# Patient Record
Sex: FEMALE | Race: WHITE | Employment: FULL TIME | URBAN - METROPOLITAN AREA
[De-identification: names, ages, dates, MRNs, and addresses within clinical notes are randomized per-mention and may not be internally consistent; named-entity substitution may affect disease eponyms.]

---

## 2017-06-20 ENCOUNTER — HOSPITAL ENCOUNTER (EMERGENCY)
Age: 52
Discharge: HOME | End: 2017-06-20
Attending: EMERGENCY MEDICINE
Payer: COMMERCIAL

## 2017-06-20 VITALS
OXYGEN SATURATION: 98 % | DIASTOLIC BLOOD PRESSURE: 84 MMHG | WEIGHT: 140 LBS | HEART RATE: 57 BPM | TEMPERATURE: 97.7 F | RESPIRATION RATE: 10 BRPM | HEIGHT: 67 IN | SYSTOLIC BLOOD PRESSURE: 127 MMHG | BODY MASS INDEX: 21.97 KG/M2

## 2017-06-20 DIAGNOSIS — R03.0 ELEVATED BLOOD PRESSURE READING WITHOUT DIAGNOSIS OF HYPERTENSION: ICD-10-CM

## 2017-06-20 DIAGNOSIS — R07.9 ACUTE CHEST PAIN: Primary | ICD-10-CM

## 2017-06-20 LAB
ANION GAP BLD CALC-SCNC: 8 MMOL/L (ref 5–15)
BASOPHILS # BLD AUTO: 0 K/UL (ref 0–0.1)
BASOPHILS # BLD: 0 % (ref 0–1)
BUN SERPL-MCNC: 11 MG/DL (ref 6–20)
BUN/CREAT SERPL: 15 (ref 12–20)
CALCIUM SERPL-MCNC: 8.4 MG/DL (ref 8.5–10.1)
CHLORIDE SERPL-SCNC: 99 MMOL/L (ref 97–108)
CO2 SERPL-SCNC: 29 MMOL/L (ref 21–32)
CREAT SERPL-MCNC: 0.73 MG/DL (ref 0.55–1.02)
EOSINOPHIL # BLD: 0.1 K/UL (ref 0–0.4)
EOSINOPHIL NFR BLD: 2 % (ref 0–7)
ERYTHROCYTE [DISTWIDTH] IN BLOOD BY AUTOMATED COUNT: 12.2 % (ref 11.5–14.5)
GLUCOSE SERPL-MCNC: 87 MG/DL (ref 65–100)
HCT VFR BLD AUTO: 36.5 % (ref 35–47)
HGB BLD-MCNC: 12.4 G/DL (ref 11.5–16)
LYMPHOCYTES # BLD AUTO: 45 % (ref 12–49)
LYMPHOCYTES # BLD: 3.1 K/UL (ref 0.8–3.5)
MAGNESIUM SERPL-MCNC: 2 MG/DL (ref 1.6–2.4)
MCH RBC QN AUTO: 31.3 PG (ref 26–34)
MCHC RBC AUTO-ENTMCNC: 34 G/DL (ref 30–36.5)
MCV RBC AUTO: 92.2 FL (ref 80–99)
MONOCYTES # BLD: 0.6 K/UL (ref 0–1)
MONOCYTES NFR BLD AUTO: 9 % (ref 5–13)
NEUTS SEG # BLD: 3.1 K/UL (ref 1.8–8)
NEUTS SEG NFR BLD AUTO: 44 % (ref 32–75)
PLATELET # BLD AUTO: 259 K/UL (ref 150–400)
POTASSIUM SERPL-SCNC: 3.7 MMOL/L (ref 3.5–5.1)
RBC # BLD AUTO: 3.96 M/UL (ref 3.8–5.2)
SODIUM SERPL-SCNC: 136 MMOL/L (ref 136–145)
TROPONIN I BLD-MCNC: <0.04 NG/ML (ref 0–0.08)
TROPONIN I BLD-MCNC: <0.04 NG/ML (ref 0–0.08)
WBC # BLD AUTO: 6.9 K/UL (ref 3.6–11)

## 2017-06-20 PROCEDURE — 93005 ELECTROCARDIOGRAM TRACING: CPT

## 2017-06-20 PROCEDURE — 85025 COMPLETE CBC W/AUTO DIFF WBC: CPT | Performed by: EMERGENCY MEDICINE

## 2017-06-20 PROCEDURE — 83735 ASSAY OF MAGNESIUM: CPT | Performed by: EMERGENCY MEDICINE

## 2017-06-20 PROCEDURE — 36415 COLL VENOUS BLD VENIPUNCTURE: CPT | Performed by: EMERGENCY MEDICINE

## 2017-06-20 PROCEDURE — 84484 ASSAY OF TROPONIN QUANT: CPT

## 2017-06-20 PROCEDURE — 80048 BASIC METABOLIC PNL TOTAL CA: CPT | Performed by: EMERGENCY MEDICINE

## 2017-06-20 PROCEDURE — 99285 EMERGENCY DEPT VISIT HI MDM: CPT

## 2017-06-20 PROCEDURE — 74011250637 HC RX REV CODE- 250/637: Performed by: EMERGENCY MEDICINE

## 2017-06-20 RX ORDER — SODIUM CHLORIDE 0.9 % (FLUSH) 0.9 %
5-10 SYRINGE (ML) INJECTION EVERY 8 HOURS
Status: DISCONTINUED | OUTPATIENT
Start: 2017-06-20 | End: 2017-06-21 | Stop reason: HOSPADM

## 2017-06-20 RX ORDER — SODIUM CHLORIDE 0.9 % (FLUSH) 0.9 %
5-10 SYRINGE (ML) INJECTION AS NEEDED
Status: DISCONTINUED | OUTPATIENT
Start: 2017-06-20 | End: 2017-06-21 | Stop reason: HOSPADM

## 2017-06-20 RX ORDER — GUAIFENESIN 100 MG/5ML
81 LIQUID (ML) ORAL DAILY
Qty: 30 TAB | Refills: 0 | Status: SHIPPED | OUTPATIENT
Start: 2017-06-20 | End: 2019-07-30

## 2017-06-20 RX ORDER — ASPIRIN 325 MG
325 TABLET ORAL
Status: COMPLETED | OUTPATIENT
Start: 2017-06-20 | End: 2017-06-20

## 2017-06-20 RX ADMIN — ASPIRIN 325 MG: 325 TABLET, COATED ORAL at 19:38

## 2017-06-20 NOTE — ED TRIAGE NOTES
Sudden onset of chest pain with dizziness and pressure behind eyes. Pain in chest radiated into back.

## 2017-06-20 NOTE — ED NOTES
Assumed care of pt. Bed locked and in low position with call bell within reach. Using AIDET-Introduced self as Primary RN and plan discussed with pt with understanding was verbalized. Pt denies additional complaints at this time. White board updated with this nurse's name. Patient advised that medical information will be discussed and it is their own responsibility to tell nurse if such conversation should not take place in the presence of visitors. Pt verbalizes understanding.

## 2017-06-20 NOTE — ED PROVIDER NOTES
HPI Comments: 47 y.o. female with past medical history significant for hypercholesterolemia and atypical lobular hyperplasia of breast who presents to the ED with chief complaint of chest pain. Pt reports chest discomfort she describes as \"tightness\" onset this afternoon that radiates through to her back and is accompanied by lightheadedness and visual disturbance. Pt states her back pain feels like a \"dull ache between her shoulder blades. \" Pt states she recently completed tx for bronchitis. Pt states she was recently seen by her cardiologist and her calcium score showed plaque buildup in 3 areas, says the plan is to f/u for a stress test. Pt states she has hx of high cholesterol. Pt denies hx of HTN or DM. There are no other acute medical complaints voiced at this time. PCP: Anthony Campos MD    Note written by Lisa Young, as dictated by Tania Gomes MD 7:16 PM     The history is provided by the patient.         Past Medical History:   Diagnosis Date    Atypical lobular hyperplasia of breast 2011    LEFT breast     Chest pain, unspecified 3/18/2011    Nausea & vomiting     post anesthesia    Other chest pain 3/18/2011    Other malaise and fatigue 3/18/2011    Other premature beats 3/18/2011    Palpitations 3/18/2011    Pure hypercholesterolemia 3/18/2011    Shortness of breath 3/18/2011       Past Surgical History:   Procedure Laterality Date    BIOPSY OF BREAST, NEEDLE CORE  2011    LEFT    HX BREAST BIOPSY  1996    RIGHT    HX BREAST BIOPSY  7/2011    LEFT     HX GYN      reanastomosis of tubes    HX GYN  7/2012    Uterine ablation    HX MOHS PROCEDURES  2010         Family History:   Problem Relation Age of Onset    Heart Disease Father     Cancer Maternal Grandmother      breast    Breast Cancer Maternal Grandmother 36     age 42's       Social History     Social History    Marital status:      Spouse name: N/A    Number of children: N/A    Years of education: N/A     Occupational History    Not on file. Social History Main Topics    Smoking status: Never Smoker    Smokeless tobacco: Never Used    Alcohol use 1.5 oz/week     3 Glasses of wine per week    Drug use: No    Sexual activity: Not on file     Other Topics Concern    Not on file     Social History Narrative         ALLERGIES: Pcn [penicillins]; Toprol xl [metoprolol succinate]; and Zithromax [azithromycin]    Review of Systems   Constitutional: Negative for chills and fever. HENT: Negative for ear pain and sore throat. Eyes: Positive for visual disturbance. Negative for photophobia and pain. Respiratory: Negative for chest tightness and shortness of breath. Cardiovascular: Positive for chest pain (radiating to back). Negative for leg swelling. Gastrointestinal: Negative for abdominal pain, nausea and vomiting. Genitourinary: Negative for dysuria and flank pain. Musculoskeletal: Positive for back pain. Negative for neck pain. Skin: Negative for rash and wound. Neurological: Positive for light-headedness. Negative for dizziness and headaches. All other systems reviewed and are negative. Vitals:    06/20/17 1906   BP: (!) 170/93   Pulse: 64   Resp: 18   Temp: 97.7 °F (36.5 °C)   SpO2: 95%   Weight: 63.5 kg (140 lb)   Height: 5' 7\" (1.702 m)            Physical Exam   Constitutional: She is oriented to person, place, and time. She appears well-developed and well-nourished. No distress. HENT:   Head: Normocephalic and atraumatic. Mouth/Throat: Oropharynx is clear and moist.   Eyes: Conjunctivae and EOM are normal. Pupils are equal, round, and reactive to light. Neck: Normal range of motion. Cardiovascular: Normal rate, regular rhythm, normal heart sounds and intact distal pulses. No murmur heard. Pulmonary/Chest: Effort normal and breath sounds normal. No stridor. No respiratory distress. Abdominal: Soft. Bowel sounds are normal. There is no tenderness. Musculoskeletal: Normal range of motion. She exhibits no edema or tenderness. Neurological: She is alert and oriented to person, place, and time. No cranial nerve deficit. Skin: Skin is warm and dry. She is not diaphoretic. Psychiatric: She has a normal mood and affect. Nursing note and vitals reviewed. MDM  Number of Diagnoses or Management Options  Acute chest pain:   Elevated blood pressure reading without diagnosis of hypertension:   Diagnosis management comments: Chest pain with dizziness and pressure behind eyes - concerned for heart trouble. Patient EKG and labs reviewed, trop x 2 negative. D/c home to start aspirin and refer to cardiology for further evaluations. Patient also advised to have her blood pressure evaluated       Amount and/or Complexity of Data Reviewed  Clinical lab tests: ordered and reviewed  Independent visualization of images, tracings, or specimens: yes (ekg)    Patient Progress  Patient progress: improved    ED Course       Procedures    ED EKG interpretation:  Rhythm: normal sinus rhythm; and regular . Rate (approx.): 62; Axis: normal; ST/T wave: No STEMI.      Note written by Lisa Adhikari, as dictated by Gricel Shaver MD 7:03 PM

## 2017-06-21 LAB
ATRIAL RATE: 62 BPM
CALCULATED P AXIS, ECG09: 70 DEGREES
CALCULATED R AXIS, ECG10: 64 DEGREES
CALCULATED T AXIS, ECG11: 53 DEGREES
DIAGNOSIS, 93000: NORMAL
P-R INTERVAL, ECG05: 166 MS
Q-T INTERVAL, ECG07: 416 MS
QRS DURATION, ECG06: 92 MS
QTC CALCULATION (BEZET), ECG08: 422 MS
VENTRICULAR RATE, ECG03: 62 BPM

## 2017-06-21 NOTE — DISCHARGE INSTRUCTIONS
Elevated Blood Pressure: Care Instructions  Your Care Instructions    Blood pressure is a measure of how hard the blood pushes against the walls of your arteries. It's normal for blood pressure to go up and down throughout the day. But if it stays up over time, you have high blood pressure. Two numbers tell you your blood pressure. The first number is the systolic pressure. It shows how hard the blood pushes when your heart is pumping. The second number is the diastolic pressure. It shows how hard the blood pushes between heartbeats, when your heart is relaxed and filling with blood. An ideal blood pressure in adults is less than 120/80 (say \"120 over 80\"). High blood pressure is 140/90 or higher. You have high blood pressure if your top number is 140 or higher or your bottom number is 90 or higher, or both. The main test for high blood pressure is simple, fast, and painless. To diagnose high blood pressure, your doctor will test your blood pressure at different times. After testing your blood pressure, your doctor may ask you to test it again when you are home. If you are diagnosed with high blood pressure, you can work with your doctor to make a long-term plan to manage it. Follow-up care is a key part of your treatment and safety. Be sure to make and go to all appointments, and call your doctor if you are having problems. It's also a good idea to know your test results and keep a list of the medicines you take. How can you care for yourself at home? · Do not smoke. Smoking increases your risk for heart attack and stroke. If you need help quitting, talk to your doctor about stop-smoking programs and medicines. These can increase your chances of quitting for good. · Stay at a healthy weight. · Try to limit how much sodium you eat to less than 2,300 milligrams (mg) a day. Your doctor may ask you to try to eat less than 1,500 mg a day. · Be physically active.  Get at least 30 minutes of exercise on most days of the week. Walking is a good choice. You also may want to do other activities, such as running, swimming, cycling, or playing tennis or team sports. · Avoid or limit alcohol. Talk to your doctor about whether you can drink any alcohol. · Eat plenty of fruits, vegetables, and low-fat dairy products. Eat less saturated and total fats. · Learn how to check your blood pressure at home. When should you call for help? Call your doctor now or seek immediate medical care if:  · Your blood pressure is much higher than normal (such as 180/110 or higher). · You think high blood pressure is causing symptoms such as:  ¨ Severe headache. ¨ Blurry vision. Watch closely for changes in your health, and be sure to contact your doctor if:  · You do not get better as expected. Where can you learn more? Go to http://asiya-xochitl.info/. Enter J276 in the search box to learn more about \"Elevated Blood Pressure: Care Instructions. \"  Current as of: April 3, 2017  Content Version: 11.3  © 4500-7610 [employer]. Care instructions adapted under license by [employer] (which disclaims liability or warranty for this information). If you have questions about a medical condition or this instruction, always ask your healthcare professional. [address] any warranty or liability for your use of this information. Chest Pain: Care Instructions  Your Care Instructions  There are many things that can cause chest pain. Some are not serious and will get better on their own in a few days. But some kinds of chest pain need more testing and treatment. Your doctor may have recommended a follow-up visit in the next 8 to 12 hours. If you are not getting better, you may need more tests or treatment. Even though your doctor has released you, you still need to watch for any problems. The doctor carefully checked you, but sometimes problems can develop later.  If you have new symptoms or if your symptoms do not get better, get medical care right away. If you have worse or different chest pain or pressure that lasts more than 5 minutes or you passed out (lost consciousness), call 911 or seek other emergency help right away. A medical visit is only one step in your treatment. Even if you feel better, you still need to do what your doctor recommends, such as going to all suggested follow-up appointments and taking medicines exactly as directed. This will help you recover and help prevent future problems. How can you care for yourself at home? · Rest until you feel better. · Take your medicine exactly as prescribed. Call your doctor if you think you are having a problem with your medicine. · Do not drive after taking a prescription pain medicine. When should you call for help? Call 911 if:  · You passed out (lost consciousness). · You have severe difficulty breathing. · You have symptoms of a heart attack. These may include:  ¨ Chest pain or pressure, or a strange feeling in your chest.  ¨ Sweating. ¨ Shortness of breath. ¨ Nausea or vomiting. ¨ Pain, pressure, or a strange feeling in your back, neck, jaw, or upper belly or in one or both shoulders or arms. ¨ Lightheadedness or sudden weakness. ¨ A fast or irregular heartbeat. After you call 911, the  may tell you to chew 1 adult-strength or 2 to 4 low-dose aspirin. Wait for an ambulance. Do not try to drive yourself. Call your doctor today if:  · You have any trouble breathing. · Your chest pain gets worse. · You are dizzy or lightheaded, or you feel like you may faint. · You are not getting better as expected. · You are having new or different chest pain. Where can you learn more? Go to http://asiya-xochitl.info/. Enter A120 in the search box to learn more about \"Chest Pain: Care Instructions. \"  Current as of: March 20, 2017  Content Version: 11.3  © 4450-7894 [employer]. Care instructions adapted under license by [employer] (which disclaims liability or warranty for this information). If you have questions about a medical condition or this instruction, always ask your healthcare professional. [address] any warranty or liability for your use of this information.

## 2017-06-21 NOTE — ED NOTES
Dr. Cindy Chan has reviewed discharge instructions with patient and spouse including prescription(s) if applicable. Patient has received and verbalizes understanding of all instructions and has no further questions at this time. Patient exits ED via ambulatory accompanied by spouse. Patient in no acute distress.

## 2017-12-29 ENCOUNTER — HOSPITAL ENCOUNTER (OUTPATIENT)
Dept: MAMMOGRAPHY | Age: 52
Discharge: HOME | End: 2017-12-29
Attending: SURGERY
Payer: COMMERCIAL

## 2017-12-29 DIAGNOSIS — Z12.31 VISIT FOR SCREENING MAMMOGRAM: ICD-10-CM

## 2017-12-29 PROCEDURE — 77067 SCR MAMMO BI INCL CAD: CPT

## 2018-01-05 ENCOUNTER — HOSPITAL ENCOUNTER (OUTPATIENT)
Dept: MAMMOGRAPHY | Age: 53
Discharge: HOME | End: 2018-01-05
Payer: COMMERCIAL

## 2018-01-05 ENCOUNTER — HOSPITAL ENCOUNTER (OUTPATIENT)
Dept: ULTRASOUND IMAGING | Age: 53
Discharge: HOME | End: 2018-01-05
Payer: COMMERCIAL

## 2018-01-05 DIAGNOSIS — R92.8 ABNORMAL MAMMOGRAM: ICD-10-CM

## 2018-01-05 PROCEDURE — 77065 DX MAMMO INCL CAD UNI: CPT

## 2018-01-05 PROCEDURE — 76642 ULTRASOUND BREAST LIMITED: CPT

## 2018-12-28 DIAGNOSIS — Z12.31 VISIT FOR SCREENING MAMMOGRAM: Primary | ICD-10-CM

## 2018-12-31 ENCOUNTER — HOSPITAL ENCOUNTER (OUTPATIENT)
Dept: MAMMOGRAPHY | Age: 53
Discharge: HOME | End: 2018-12-31
Attending: SURGERY
Payer: COMMERCIAL

## 2018-12-31 DIAGNOSIS — Z12.31 VISIT FOR SCREENING MAMMOGRAM: ICD-10-CM

## 2018-12-31 PROCEDURE — 77063 BREAST TOMOSYNTHESIS BI: CPT

## 2019-07-30 ENCOUNTER — HOSPITAL ENCOUNTER (OUTPATIENT)
Dept: PREADMISSION TESTING | Age: 54
Discharge: HOME | End: 2019-07-30
Payer: COMMERCIAL

## 2019-07-30 ENCOUNTER — HOSPITAL ENCOUNTER (OUTPATIENT)
Dept: NON INVASIVE DIAGNOSTICS | Age: 54
Discharge: HOME | End: 2019-07-30
Attending: ORTHOPAEDIC SURGERY
Payer: COMMERCIAL

## 2019-07-30 VITALS
WEIGHT: 140.7 LBS | SYSTOLIC BLOOD PRESSURE: 128 MMHG | RESPIRATION RATE: 18 BRPM | TEMPERATURE: 98.2 F | HEIGHT: 66 IN | BODY MASS INDEX: 22.61 KG/M2 | DIASTOLIC BLOOD PRESSURE: 67 MMHG | OXYGEN SATURATION: 99 % | HEART RATE: 59 BPM

## 2019-07-30 LAB
25(OH)D3 SERPL-MCNC: 35.1 NG/ML (ref 30–100)
ABO + RH BLD: NORMAL
ALBUMIN SERPL-MCNC: 4.2 G/DL (ref 3.5–5)
ALBUMIN/GLOB SERPL: 1.3 {RATIO} (ref 1.1–2.2)
ALP SERPL-CCNC: 64 U/L (ref 45–117)
ALT SERPL-CCNC: 55 U/L (ref 12–78)
ANION GAP SERPL CALC-SCNC: 2 MMOL/L (ref 5–15)
APPEARANCE UR: CLEAR
APTT PPP: 25.9 SEC (ref 22.1–32)
AST SERPL-CCNC: 36 U/L (ref 15–37)
ATRIAL RATE: 53 BPM
BACTERIA URNS QL MICRO: NEGATIVE /HPF
BASOPHILS # BLD: 0.1 K/UL (ref 0–0.1)
BASOPHILS NFR BLD: 1 % (ref 0–1)
BILIRUB SERPL-MCNC: 0.4 MG/DL (ref 0.2–1)
BILIRUB UR QL: NEGATIVE
BLOOD GROUP ANTIBODIES SERPL: NORMAL
BUN SERPL-MCNC: 10 MG/DL (ref 6–20)
BUN/CREAT SERPL: 15 (ref 12–20)
CALCIUM SERPL-MCNC: 9.3 MG/DL (ref 8.5–10.1)
CALCULATED P AXIS, ECG09: 75 DEGREES
CALCULATED R AXIS, ECG10: 68 DEGREES
CALCULATED T AXIS, ECG11: 55 DEGREES
CHLORIDE SERPL-SCNC: 99 MMOL/L (ref 97–108)
CO2 SERPL-SCNC: 34 MMOL/L (ref 21–32)
COLOR UR: NORMAL
CREAT SERPL-MCNC: 0.68 MG/DL (ref 0.55–1.02)
DIAGNOSIS, 93000: NORMAL
DIFFERENTIAL METHOD BLD: NORMAL
EOSINOPHIL # BLD: 0.1 K/UL (ref 0–0.4)
EOSINOPHIL NFR BLD: 2 % (ref 0–7)
EPITH CASTS URNS QL MICRO: NORMAL /LPF
ERYTHROCYTE [DISTWIDTH] IN BLOOD BY AUTOMATED COUNT: 12.5 % (ref 11.5–14.5)
EST. AVERAGE GLUCOSE BLD GHB EST-MCNC: 114 MG/DL
GLOBULIN SER CALC-MCNC: 3.3 G/DL (ref 2–4)
GLUCOSE SERPL-MCNC: 74 MG/DL (ref 65–100)
GLUCOSE UR STRIP.AUTO-MCNC: NEGATIVE MG/DL
HBA1C MFR BLD: 5.6 % (ref 4.2–6.3)
HCT VFR BLD AUTO: 40.4 % (ref 35–47)
HGB BLD-MCNC: 13.3 G/DL (ref 11.5–16)
HGB UR QL STRIP: NEGATIVE
HYALINE CASTS URNS QL MICRO: NORMAL /LPF (ref 0–5)
IMM GRANULOCYTES # BLD AUTO: 0 K/UL (ref 0–0.04)
IMM GRANULOCYTES NFR BLD AUTO: 0 % (ref 0–0.5)
INR PPP: 1 (ref 0.9–1.1)
KETONES UR QL STRIP.AUTO: NEGATIVE MG/DL
LEUKOCYTE ESTERASE UR QL STRIP.AUTO: NEGATIVE
LYMPHOCYTES # BLD: 1.9 K/UL (ref 0.8–3.5)
LYMPHOCYTES NFR BLD: 36 % (ref 12–49)
MCH RBC QN AUTO: 31.4 PG (ref 26–34)
MCHC RBC AUTO-ENTMCNC: 32.9 G/DL (ref 30–36.5)
MCV RBC AUTO: 95.5 FL (ref 80–99)
MONOCYTES # BLD: 0.5 K/UL (ref 0–1)
MONOCYTES NFR BLD: 9 % (ref 5–13)
NEUTS SEG # BLD: 2.8 K/UL (ref 1.8–8)
NEUTS SEG NFR BLD: 52 % (ref 32–75)
NITRITE UR QL STRIP.AUTO: NEGATIVE
NRBC # BLD: 0 K/UL (ref 0–0.01)
NRBC BLD-RTO: 0 PER 100 WBC
P-R INTERVAL, ECG05: 172 MS
PH UR STRIP: 7 [PH] (ref 5–8)
PLATELET # BLD AUTO: 279 K/UL (ref 150–400)
PMV BLD AUTO: 9.8 FL (ref 8.9–12.9)
POTASSIUM SERPL-SCNC: 5 MMOL/L (ref 3.5–5.1)
PROT SERPL-MCNC: 7.5 G/DL (ref 6.4–8.2)
PROT UR STRIP-MCNC: NEGATIVE MG/DL
PROTHROMBIN TIME: 10.3 SEC (ref 9–11.1)
Q-T INTERVAL, ECG07: 450 MS
QRS DURATION, ECG06: 92 MS
QTC CALCULATION (BEZET), ECG08: 422 MS
RBC # BLD AUTO: 4.23 M/UL (ref 3.8–5.2)
RBC #/AREA URNS HPF: NORMAL /HPF (ref 0–5)
SODIUM SERPL-SCNC: 135 MMOL/L (ref 136–145)
SP GR UR REFRACTOMETRY: 1.01 (ref 1–1.03)
SPECIMEN EXP DATE BLD: NORMAL
THERAPEUTIC RANGE,PTTT: NORMAL SECS (ref 58–77)
UA: UC IF INDICATED,UAUC: NORMAL
UROBILINOGEN UR QL STRIP.AUTO: 0.2 EU/DL (ref 0.2–1)
VENTRICULAR RATE, ECG03: 53 BPM
WBC # BLD AUTO: 5.4 K/UL (ref 3.6–11)
WBC URNS QL MICRO: NORMAL /HPF (ref 0–4)

## 2019-07-30 PROCEDURE — 81001 URINALYSIS AUTO W/SCOPE: CPT

## 2019-07-30 PROCEDURE — 93005 ELECTROCARDIOGRAM TRACING: CPT

## 2019-07-30 PROCEDURE — 36415 COLL VENOUS BLD VENIPUNCTURE: CPT

## 2019-07-30 PROCEDURE — 85730 THROMBOPLASTIN TIME PARTIAL: CPT

## 2019-07-30 PROCEDURE — 82306 VITAMIN D 25 HYDROXY: CPT

## 2019-07-30 PROCEDURE — 85025 COMPLETE CBC W/AUTO DIFF WBC: CPT

## 2019-07-30 PROCEDURE — 85610 PROTHROMBIN TIME: CPT

## 2019-07-30 PROCEDURE — 83036 HEMOGLOBIN GLYCOSYLATED A1C: CPT

## 2019-07-30 PROCEDURE — 80053 COMPREHEN METABOLIC PANEL: CPT

## 2019-07-30 PROCEDURE — 86900 BLOOD TYPING SEROLOGIC ABO: CPT

## 2019-07-30 RX ORDER — ROSUVASTATIN CALCIUM 20 MG/1
20 TABLET, COATED ORAL
COMMUNITY

## 2019-07-30 RX ORDER — ESTRADIOL 0.07 MG/D
1 PATCH TRANSDERMAL 2 TIMES WEEKLY
COMMUNITY

## 2019-07-30 RX ORDER — PHENYLEPHRINE HCL 10 MG/1
1 TABLET, FILM COATED ORAL AS NEEDED
COMMUNITY

## 2019-07-30 RX ORDER — CHOLECALCIFEROL TAB 125 MCG (5000 UNIT) 125 MCG
5000 TAB ORAL DAILY
Qty: 30 TAB | Refills: 0 | Status: ON HOLD | OUTPATIENT
Start: 2019-07-30 | End: 2019-08-16 | Stop reason: SDUPTHER

## 2019-07-30 RX ORDER — CETIRIZINE HCL 10 MG
10 TABLET ORAL
COMMUNITY

## 2019-07-30 NOTE — PERIOP NOTES
[address] [facility] [address], [address] [place]   MAIN OR                                  [phone]   MAIN PRE OP                          [phone]                                                                                AMBULATORY PRE OP          [phone]  PRE-ADMISSION TESTING    [phone]   Surgery Date:   8/13/2019 Tuesday      Is surgery arrival time given by surgeon? NO  If NO, [address] [facility] staff will call you between 3 and 7pm the day before your surgery with your arrival time. (If your surgery is on a Monday, we will call you the Friday before.)    Call [phone] after 7pm Monday-Friday if you did not receive your arrival time. INSTRUCTIONS BEFORE YOUR SURGERY   When You  Arrive Arrive at the 2nd [address] [facility] on the day of your surgery  Have your insurance card, photo ID, and any copayment (if needed)   Food   and   Drink NO food or drink after midnight the night before surgery    This means NO water, gum, mints, coffee, juice, etc.  No alcohol (beer, wine, liquor) 24 hours before and after surgery   Medications to   TAKE   Morning of Surgery MEDICATIONS TO TAKE THE MORNING OF SURGERY WITH A SIP OF WATER:    Lexapro, Zyrtec   Medications  To  STOP      7 days before surgery  Non-Steroidal anti-inflammatory Drugs (NSAID's): for example, Ibuprofen (Advil, Motrin), Naproxen (Aleve)   Aspirin, if taking for pain    Herbal supplements, vitamins, and fish oil   Other:  (Pain medications not listed above, including Tylenol may be taken)   Blood  Thinners  If you take  Aspirin, Plavix, Coumadin, or any blood-thinning or anti-blood clot medicine, talk to the doctor who prescribed the medications for pre-operative instructions.    Bathing Clothing  Jewelry  Valuables       If you shower the morning of surgery, please do not apply anything to your skin (lotions, powders, deodorant, or makeup, especially mascara)   Follow all special bath instructions (for total joint replacement, spine and bowel surgeries)   Do not shave or trim anywhere 24 hours before surgery   Wear your hair loose or down; no pony-tails, buns, or metal hair clips   Wear loose, comfortable, clean clothes   Wear glasses instead of contacts   Leave money, valuables, and jewelry, including body piercings, at home   Going Home - or Spending the Night  SAME-DAY SURGERY: You must have a responsible adult drive you home and stay with you 24 hours after surgery   ADMITS: If your doctor is keeping you into the hospital after surgery, leave personal belongings/luggage in your car until you have a hospital room number. Hospital discharge time is 12 noon  Drivers must be here before 12 noon unless you are told differently   Special Instructions   · Use Chlorhexidine Care Fusion wash and sponges 3 days prior to surgery as instructed. · Incentive spirometer given with instructions to practice at home and bring back to the hospital on the day of surgery. · [facility] will contact you if your Hemoglobin A1C is greater than 7.5. · Ensure/Glucerna  sample, nutritional information, and Ensure/Glucerna coupon given. · Pain pamphlet and Call Don't Fall reminder reviewed with patient. ·  parking is complimentary Monday - Friday 7 am - 5 pm  · Bring PTA Medication list day of surgery with the last doses taken documented   · Do not bring medication bottles the day of surgery     Follow all instructions so your surgery wont be cancelled. Please, be on time. If a situation occurs and you are delayed the day of surgery, call [phone]. If your physical condition changes (like a fever, cold, flu, etc.) call your surgeon. The patient was contacted  in person. Home medication reviewed and verified during PAT appointment. The patient verbalizes understanding of all instructions and does not  need reinforcement.

## 2019-07-31 LAB
BACTERIA SPEC CULT: ABNORMAL
BACTERIA SPEC CULT: ABNORMAL
SERVICE CMNT-IMP: ABNORMAL

## 2019-07-31 RX ORDER — MUPIROCIN 20 MG/G
OINTMENT TOPICAL 2 TIMES DAILY
Qty: 22 G | Refills: 0 | Status: SHIPPED | OUTPATIENT
Start: 2019-07-31 | End: 2019-08-05

## 2019-07-31 NOTE — PROGRESS NOTES
Notification of Positive MSSA and Treatment Ordered by Preadmission Testing Nurse Practitioner      Patient Name:  Chadwick Singh  MRN: [id number]  : 1965    Surgeon: Jose Enrique Lindsey  Date of surgery: 19  Procedure: L4-5 OLIF    Allergies: Allergies   Allergen Reactions    Metoprolol Other (comments)     Dropped HR too low    Pcn [Penicillins] Hives and Rash    Zithromax [Azithromycin] Other (comments)     Stomach cramps       MRSA results: All Micro Results     Procedure Component Value Units Date/Time    MRSA CULTURE NARES [[id number]]  (Abnormal) Collected:  19 1036    Order Status:  Completed Specimen:  Nares Updated:  19 6991     Special Requests: NO SPECIAL REQUESTS        Culture result:       MRSA NOT PRESENT. Apparent Staphylococus aureus (not MRSA noted). Screening of patient nares for MRSA is for surveillance purposes and, if positive, to facilitate isolation considerations in high risk settings. It is not intended for automatic decolonization interventions per se as regimens are not sufficiently effective to warrant routine use.                 Treatment ordered: Bactroban ointment 2%- apply intranasal twice daily for 5 days    Date patient notified of results / treatment prescribed: 19 via voice mail    The patient was instructed to add medication and date they began treatment to their \"Prior to Admission Medication\" list given to them in [address], [place]

## 2019-08-11 ENCOUNTER — ANESTHESIA EVENT (OUTPATIENT)
Dept: SURGERY | Age: 54
DRG: 455 | End: 2019-08-11
Payer: COMMERCIAL

## 2019-08-13 ENCOUNTER — APPOINTMENT (OUTPATIENT)
Dept: GENERAL RADIOLOGY | Age: 54
DRG: 455 | End: 2019-08-13
Attending: ORTHOPAEDIC SURGERY
Payer: COMMERCIAL

## 2019-08-13 ENCOUNTER — HOSPITAL ENCOUNTER (INPATIENT)
Age: 54
LOS: 3 days | Discharge: HOME | DRG: 455 | End: 2019-08-16
Attending: ORTHOPAEDIC SURGERY | Admitting: ORTHOPAEDIC SURGERY
Payer: COMMERCIAL

## 2019-08-13 ENCOUNTER — ANESTHESIA (OUTPATIENT)
Dept: SURGERY | Age: 54
DRG: 455 | End: 2019-08-13
Payer: COMMERCIAL

## 2019-08-13 DIAGNOSIS — M48.062 LUMBAR STENOSIS WITH NEUROGENIC CLAUDICATION: Primary | ICD-10-CM

## 2019-08-13 LAB
GLUCOSE BLD STRIP.AUTO-MCNC: 113 MG/DL (ref 65–100)
SERVICE CMNT-IMP: ABNORMAL

## 2019-08-13 PROCEDURE — C1713 ANCHOR/SCREW BN/BN,TIS/BN: HCPCS | Performed by: ORTHOPAEDIC SURGERY

## 2019-08-13 PROCEDURE — C9290 INJ, BUPIVACAINE LIPOSOME: HCPCS | Performed by: ORTHOPAEDIC SURGERY

## 2019-08-13 PROCEDURE — 77030002966 HC SUT PDS J&J -A: Performed by: ORTHOPAEDIC SURGERY

## 2019-08-13 PROCEDURE — 77030020263 HC SOL INJ SOD CL0.9% LFCR 1000ML: Performed by: ORTHOPAEDIC SURGERY

## 2019-08-13 PROCEDURE — 01NB0ZZ RELEASE LUMBAR NERVE, OPEN APPROACH: ICD-10-PCS | Performed by: ORTHOPAEDIC SURGERY

## 2019-08-13 PROCEDURE — 77030040361 HC SLV COMPR DVT MDII -B

## 2019-08-13 PROCEDURE — 77030033138 HC SUT PGA STRATFX J&J -B: Performed by: ORTHOPAEDIC SURGERY

## 2019-08-13 PROCEDURE — 74011250637 HC RX REV CODE- 250/637: Performed by: NURSE PRACTITIONER

## 2019-08-13 PROCEDURE — 77030029099 HC BN WAX SSPC -A: Performed by: ORTHOPAEDIC SURGERY

## 2019-08-13 PROCEDURE — 82962 GLUCOSE BLOOD TEST: CPT

## 2019-08-13 PROCEDURE — 76010000180 HC OR TIME 6.5 TO 7 HR INTENSV-TIER 1: Performed by: ORTHOPAEDIC SURGERY

## 2019-08-13 PROCEDURE — 0SG00A0 FUSION OF LUMBAR VERTEBRAL JOINT WITH INTERBODY FUSION DEVICE, ANTERIOR APPROACH, ANTERIOR COLUMN, OPEN APPROACH: ICD-10-PCS | Performed by: ORTHOPAEDIC SURGERY

## 2019-08-13 PROCEDURE — 74011000272 HC RX REV CODE- 272: Performed by: ORTHOPAEDIC SURGERY

## 2019-08-13 PROCEDURE — 74011250636 HC RX REV CODE- 250/636: Performed by: NURSE ANESTHETIST, CERTIFIED REGISTERED

## 2019-08-13 PROCEDURE — 0ST20ZZ RESECTION OF LUMBAR VERTEBRAL DISC, OPEN APPROACH: ICD-10-PCS | Performed by: ORTHOPAEDIC SURGERY

## 2019-08-13 PROCEDURE — 74011250636 HC RX REV CODE- 250/636: Performed by: ANESTHESIOLOGY

## 2019-08-13 PROCEDURE — 77030018836 HC SOL IRR NACL ICUM -A: Performed by: ORTHOPAEDIC SURGERY

## 2019-08-13 PROCEDURE — 77030003666 HC NDL SPINAL BD -A: Performed by: ORTHOPAEDIC SURGERY

## 2019-08-13 PROCEDURE — 74011250636 HC RX REV CODE- 250/636: Performed by: ORTHOPAEDIC SURGERY

## 2019-08-13 PROCEDURE — 76210000001 HC OR PH I REC 2.5 TO 3 HR: Performed by: ORTHOPAEDIC SURGERY

## 2019-08-13 PROCEDURE — 77030012406 HC DRN WND PENRS BARD -A: Performed by: ORTHOPAEDIC SURGERY

## 2019-08-13 PROCEDURE — 77030031139 HC SUT VCRL2 J&J -A: Performed by: ORTHOPAEDIC SURGERY

## 2019-08-13 PROCEDURE — 77030013079 HC BLNKT BAIR HGGR 3M -A: Performed by: ANESTHESIOLOGY

## 2019-08-13 PROCEDURE — 77030002996 HC SUT SLK J&J -A: Performed by: ORTHOPAEDIC SURGERY

## 2019-08-13 PROCEDURE — 8E0WXBF COMPUTER ASSISTED PROCEDURE OF TRUNK REGION, WITH FLUOROSCOPY: ICD-10-PCS | Performed by: ORTHOPAEDIC SURGERY

## 2019-08-13 PROCEDURE — 0SG0071 FUSION OF LUMBAR VERTEBRAL JOINT WITH AUTOLOGOUS TISSUE SUBSTITUTE, POSTERIOR APPROACH, POSTERIOR COLUMN, OPEN APPROACH: ICD-10-PCS | Performed by: ORTHOPAEDIC SURGERY

## 2019-08-13 PROCEDURE — 74011000250 HC RX REV CODE- 250: Performed by: NURSE ANESTHETIST, CERTIFIED REGISTERED

## 2019-08-13 PROCEDURE — 77030018846 HC SOL IRR STRL H20 ICUM -A: Performed by: ORTHOPAEDIC SURGERY

## 2019-08-13 PROCEDURE — 77030039788 HC GRFT BN DBM OSTEO INLC -F: Performed by: ORTHOPAEDIC SURGERY

## 2019-08-13 PROCEDURE — 77030019908 HC STETH ESOPH SIMS -A: Performed by: ANESTHESIOLOGY

## 2019-08-13 PROCEDURE — 77030002982 HC SUT POLYSRB J&J -A: Performed by: ORTHOPAEDIC SURGERY

## 2019-08-13 PROCEDURE — 77030008684 HC TU ET CUF COVD -B: Performed by: NURSE ANESTHETIST, CERTIFIED REGISTERED

## 2019-08-13 PROCEDURE — 77030014647 HC SEAL FBRN TISSL BAXT -D: Performed by: ORTHOPAEDIC SURGERY

## 2019-08-13 PROCEDURE — 77030013474 HC CRD BPLR DISP ADLR -A: Performed by: ORTHOPAEDIC SURGERY

## 2019-08-13 PROCEDURE — 77030004391 HC BUR FLUT MEDT -C: Performed by: ORTHOPAEDIC SURGERY

## 2019-08-13 PROCEDURE — 77030035129: Performed by: ORTHOPAEDIC SURGERY

## 2019-08-13 PROCEDURE — 77030039266 HC ADH SKN EXOFIN S2SG -A: Performed by: ORTHOPAEDIC SURGERY

## 2019-08-13 PROCEDURE — 74011000250 HC RX REV CODE- 250: Performed by: ORTHOPAEDIC SURGERY

## 2019-08-13 PROCEDURE — 77030020268 HC MISC GENERAL SUPPLY: Performed by: ORTHOPAEDIC SURGERY

## 2019-08-13 PROCEDURE — 77030009793 HC KT TU LTA HOSP -A: Performed by: ANESTHESIOLOGY

## 2019-08-13 PROCEDURE — 77030011640 HC PAD GRND REM COVD -A: Performed by: ORTHOPAEDIC SURGERY

## 2019-08-13 PROCEDURE — 76060000044 HC ANESTHESIA 6.5 TO 7 HR: Performed by: ORTHOPAEDIC SURGERY

## 2019-08-13 PROCEDURE — 07DR0ZZ EXTRACTION OF ILIAC BONE MARROW, OPEN APPROACH: ICD-10-PCS | Performed by: ORTHOPAEDIC SURGERY

## 2019-08-13 PROCEDURE — 74011250636 HC RX REV CODE- 250/636: Performed by: NURSE PRACTITIONER

## 2019-08-13 PROCEDURE — 77030018723 HC ELCTRD BLD COVD -A: Performed by: ORTHOPAEDIC SURGERY

## 2019-08-13 PROCEDURE — 74011250637 HC RX REV CODE- 250/637: Performed by: ANESTHESIOLOGY

## 2019-08-13 PROCEDURE — 77030034169 HC GRFT BN BIOACTV INTRFC 1G BSTEB -F: Performed by: ORTHOPAEDIC SURGERY

## 2019-08-13 PROCEDURE — 77030026438 HC STYL ET INTUB CARD -A: Performed by: NURSE ANESTHETIST, CERTIFIED REGISTERED

## 2019-08-13 PROCEDURE — 77030011264 HC ELECTRD BLD EXT COVD -A: Performed by: ORTHOPAEDIC SURGERY

## 2019-08-13 PROCEDURE — 77030020782 HC GWN BAIR PAWS FLX 3M -B

## 2019-08-13 PROCEDURE — 77030010938 HC CLP LIG TELE -A: Performed by: ORTHOPAEDIC SURGERY

## 2019-08-13 PROCEDURE — 4A11X4G MONITORING OF PERIPHERAL NERVOUS ELECTRICAL ACTIVITY, INTRAOPERATIVE, EXTERNAL APPROACH: ICD-10-PCS | Performed by: ORTHOPAEDIC SURGERY

## 2019-08-13 PROCEDURE — 77030021668 HC NEB PREFIL KT VYRM -A

## 2019-08-13 PROCEDURE — 77030020061 HC IV BLD WRMR ADMIN SET 3M -B: Performed by: ANESTHESIOLOGY

## 2019-08-13 PROCEDURE — 77030034475 HC MISC IMPL SPN: Performed by: ORTHOPAEDIC SURGERY

## 2019-08-13 PROCEDURE — 65270000029 HC RM PRIVATE

## 2019-08-13 PROCEDURE — 77030034850: Performed by: ORTHOPAEDIC SURGERY

## 2019-08-13 DEVICE — GRAFT BNE SUB 7.5GM PTTY SYN SIGNAFUSE: Type: IMPLANTABLE DEVICE | Site: SPINE LUMBAR | Status: FUNCTIONAL

## 2019-08-13 DEVICE — ROD 1553201035 5.5 TI CP4 NS CURV 35MM
Type: IMPLANTABLE DEVICE | Site: SPINE LUMBAR | Status: FUNCTIONAL
Brand: CD HORIZON® SPINAL SYSTEM

## 2019-08-13 DEVICE — Z DUP USE 2633873 GRAFT BNE MED OSTEOSTRAND: Type: IMPLANTABLE DEVICE | Site: SPINE LUMBAR | Status: FUNCTIONAL

## 2019-08-13 DEVICE — INTERFACE IS A SYNTHETIC BIOACTIVE BONE GRAFT FOR USE IN THE REPAIR OF OSSEOUS DEFECTS. IT IS SUPPLIED AS IRREGULAR SYNTHETIC GRANULES OF BIOACTIVE GLASS (45S5 BIOGLASS), SIZED FROM 200 MICRONS TO 420 MICRONS. WHEN IMPLANTED IN LIVING TISSUE, THE MATERIAL UNDERGOES A TIME DEPENDENT SURFACE MODIFICATION. THE SURFACE REACTION RESULTS IN THE FORMATION OF A CALCIUM PHOSPHATE LAYER, WHICH IS EQUIVALENT IN COMPOSITION AND STRUCTURE TO THE HYDROXYAPATITE FOUND IN BONE MINERAL. THE BIOLOGICAL APATITE LAYER OF THE GRANULES PROVIDES AN OSTEOCONDUCTIVE SCAFFOLD FOR THE GENERATION OF NEW OSSEOUS TISSUE. NEW BONE INFILTRATES AROUND THE GRANULES ALLOWING THE REPAIR OF THE DEFECT AS THE GRANULES ARE ABSORBED.
Type: IMPLANTABLE DEVICE | Site: SPINE LUMBAR | Status: FUNCTIONAL
Brand: INTERFACE

## 2019-08-13 RX ORDER — ROCURONIUM BROMIDE 10 MG/ML
INJECTION, SOLUTION INTRAVENOUS AS NEEDED
Status: DISCONTINUED | OUTPATIENT
Start: 2019-08-13 | End: 2019-08-13 | Stop reason: HOSPADM

## 2019-08-13 RX ORDER — SODIUM CHLORIDE, SODIUM LACTATE, POTASSIUM CHLORIDE, CALCIUM CHLORIDE 600; 310; 30; 20 MG/100ML; MG/100ML; MG/100ML; MG/100ML
INJECTION, SOLUTION INTRAVENOUS
Status: DISCONTINUED | OUTPATIENT
Start: 2019-08-13 | End: 2019-08-13 | Stop reason: HOSPADM

## 2019-08-13 RX ORDER — FAMOTIDINE 10 MG/ML
INJECTION INTRAVENOUS AS NEEDED
Status: DISCONTINUED | OUTPATIENT
Start: 2019-08-13 | End: 2019-08-13 | Stop reason: HOSPADM

## 2019-08-13 RX ORDER — VANCOMYCIN HYDROCHLORIDE 1 G/20ML
INJECTION, POWDER, LYOPHILIZED, FOR SOLUTION INTRAVENOUS AS NEEDED
Status: DISCONTINUED | OUTPATIENT
Start: 2019-08-13 | End: 2019-08-13 | Stop reason: HOSPADM

## 2019-08-13 RX ORDER — FAMOTIDINE 20 MG/1
20 TABLET, FILM COATED ORAL 2 TIMES DAILY
Status: DISCONTINUED | OUTPATIENT
Start: 2019-08-13 | End: 2019-08-16 | Stop reason: HOSPADM

## 2019-08-13 RX ORDER — CHOLECALCIFEROL TAB 125 MCG (5000 UNIT) 125 MCG
5000 TAB ORAL DAILY
Status: DISCONTINUED | OUTPATIENT
Start: 2019-08-14 | End: 2019-08-16 | Stop reason: HOSPADM

## 2019-08-13 RX ORDER — SODIUM CHLORIDE, SODIUM LACTATE, POTASSIUM CHLORIDE, CALCIUM CHLORIDE 600; 310; 30; 20 MG/100ML; MG/100ML; MG/100ML; MG/100ML
125 INJECTION, SOLUTION INTRAVENOUS CONTINUOUS
Status: DISCONTINUED | OUTPATIENT
Start: 2019-08-13 | End: 2019-08-13 | Stop reason: HOSPADM

## 2019-08-13 RX ORDER — EPHEDRINE SULFATE/0.9% NACL/PF 50 MG/5 ML
SYRINGE (ML) INTRAVENOUS AS NEEDED
Status: DISCONTINUED | OUTPATIENT
Start: 2019-08-13 | End: 2019-08-13 | Stop reason: HOSPADM

## 2019-08-13 RX ORDER — NALOXONE HYDROCHLORIDE 0.4 MG/ML
0.2 INJECTION, SOLUTION INTRAMUSCULAR; INTRAVENOUS; SUBCUTANEOUS
Status: DISCONTINUED | OUTPATIENT
Start: 2019-08-13 | End: 2019-08-13 | Stop reason: HOSPADM

## 2019-08-13 RX ORDER — DIPHENHYDRAMINE HYDROCHLORIDE 50 MG/ML
12.5 INJECTION, SOLUTION INTRAMUSCULAR; INTRAVENOUS AS NEEDED
Status: DISCONTINUED | OUTPATIENT
Start: 2019-08-13 | End: 2019-08-13 | Stop reason: HOSPADM

## 2019-08-13 RX ORDER — CEFAZOLIN SODIUM/WATER 2 G/20 ML
2 SYRINGE (ML) INTRAVENOUS EVERY 8 HOURS
Status: COMPLETED | OUTPATIENT
Start: 2019-08-13 | End: 2019-08-14

## 2019-08-13 RX ORDER — ROSUVASTATIN CALCIUM 10 MG/1
20 TABLET, COATED ORAL
Status: DISCONTINUED | OUTPATIENT
Start: 2019-08-13 | End: 2019-08-16 | Stop reason: HOSPADM

## 2019-08-13 RX ORDER — KETOROLAC TROMETHAMINE 30 MG/ML
30 INJECTION, SOLUTION INTRAMUSCULAR; INTRAVENOUS EVERY 6 HOURS
Status: COMPLETED | OUTPATIENT
Start: 2019-08-13 | End: 2019-08-14

## 2019-08-13 RX ORDER — HYDROMORPHONE HYDROCHLORIDE 2 MG/ML
INJECTION, SOLUTION INTRAMUSCULAR; INTRAVENOUS; SUBCUTANEOUS AS NEEDED
Status: DISCONTINUED | OUTPATIENT
Start: 2019-08-13 | End: 2019-08-13 | Stop reason: HOSPADM

## 2019-08-13 RX ORDER — CYCLOBENZAPRINE HCL 10 MG
10 TABLET ORAL
Status: DISCONTINUED | OUTPATIENT
Start: 2019-08-13 | End: 2019-08-16 | Stop reason: HOSPADM

## 2019-08-13 RX ORDER — ONDANSETRON 2 MG/ML
INJECTION INTRAMUSCULAR; INTRAVENOUS AS NEEDED
Status: DISCONTINUED | OUTPATIENT
Start: 2019-08-13 | End: 2019-08-13 | Stop reason: HOSPADM

## 2019-08-13 RX ORDER — KETAMINE HYDROCHLORIDE 10 MG/ML
INJECTION, SOLUTION INTRAMUSCULAR; INTRAVENOUS AS NEEDED
Status: DISCONTINUED | OUTPATIENT
Start: 2019-08-13 | End: 2019-08-13 | Stop reason: HOSPADM

## 2019-08-13 RX ORDER — HYDROMORPHONE HYDROCHLORIDE 1 MG/ML
.25-1 INJECTION, SOLUTION INTRAMUSCULAR; INTRAVENOUS; SUBCUTANEOUS
Status: DISCONTINUED | OUTPATIENT
Start: 2019-08-13 | End: 2019-08-13 | Stop reason: HOSPADM

## 2019-08-13 RX ORDER — DIPHENHYDRAMINE HYDROCHLORIDE 50 MG/ML
12.5 INJECTION, SOLUTION INTRAMUSCULAR; INTRAVENOUS
Status: ACTIVE | OUTPATIENT
Start: 2019-08-13 | End: 2019-08-14

## 2019-08-13 RX ORDER — SODIUM CHLORIDE 0.9 % (FLUSH) 0.9 %
5-40 SYRINGE (ML) INJECTION EVERY 8 HOURS
Status: DISCONTINUED | OUTPATIENT
Start: 2019-08-13 | End: 2019-08-16 | Stop reason: HOSPADM

## 2019-08-13 RX ORDER — PROPOFOL 10 MG/ML
INJECTION, EMULSION INTRAVENOUS AS NEEDED
Status: DISCONTINUED | OUTPATIENT
Start: 2019-08-13 | End: 2019-08-13 | Stop reason: HOSPADM

## 2019-08-13 RX ORDER — METOCLOPRAMIDE 10 MG/1
10 TABLET ORAL
Status: DISCONTINUED | OUTPATIENT
Start: 2019-08-13 | End: 2019-08-16 | Stop reason: HOSPADM

## 2019-08-13 RX ORDER — AMOXICILLIN 250 MG
1 CAPSULE ORAL 2 TIMES DAILY
Status: DISCONTINUED | OUTPATIENT
Start: 2019-08-13 | End: 2019-08-16 | Stop reason: HOSPADM

## 2019-08-13 RX ORDER — CEFAZOLIN SODIUM 1 G/3ML
INJECTION, POWDER, FOR SOLUTION INTRAMUSCULAR; INTRAVENOUS AS NEEDED
Status: DISCONTINUED | OUTPATIENT
Start: 2019-08-13 | End: 2019-08-13 | Stop reason: HOSPADM

## 2019-08-13 RX ORDER — SCOLOPAMINE TRANSDERMAL SYSTEM 1 MG/1
1 PATCH, EXTENDED RELEASE TRANSDERMAL
Status: COMPLETED | OUTPATIENT
Start: 2019-08-13 | End: 2019-08-16

## 2019-08-13 RX ORDER — MORPHINE SULFATE 4 MG/ML
2 INJECTION INTRAVENOUS
Status: ACTIVE | OUTPATIENT
Start: 2019-08-13 | End: 2019-08-14

## 2019-08-13 RX ORDER — DEXAMETHASONE SODIUM PHOSPHATE 4 MG/ML
INJECTION, SOLUTION INTRA-ARTICULAR; INTRALESIONAL; INTRAMUSCULAR; INTRAVENOUS; SOFT TISSUE AS NEEDED
Status: DISCONTINUED | OUTPATIENT
Start: 2019-08-13 | End: 2019-08-13 | Stop reason: HOSPADM

## 2019-08-13 RX ORDER — TRAMADOL HYDROCHLORIDE 50 MG/1
50 TABLET ORAL
Status: DISCONTINUED | OUTPATIENT
Start: 2019-08-13 | End: 2019-08-16 | Stop reason: HOSPADM

## 2019-08-13 RX ORDER — SUCCINYLCHOLINE CHLORIDE 20 MG/ML
INJECTION INTRAMUSCULAR; INTRAVENOUS AS NEEDED
Status: DISCONTINUED | OUTPATIENT
Start: 2019-08-13 | End: 2019-08-13 | Stop reason: HOSPADM

## 2019-08-13 RX ORDER — ONDANSETRON 2 MG/ML
4 INJECTION INTRAMUSCULAR; INTRAVENOUS
Status: DISPENSED | OUTPATIENT
Start: 2019-08-13 | End: 2019-08-14

## 2019-08-13 RX ORDER — NEOSTIGMINE METHYLSULFATE 1 MG/ML
INJECTION, SOLUTION INTRAVENOUS AS NEEDED
Status: DISCONTINUED | OUTPATIENT
Start: 2019-08-13 | End: 2019-08-13 | Stop reason: HOSPADM

## 2019-08-13 RX ORDER — POLYETHYLENE GLYCOL 3350 17 G/17G
17 POWDER, FOR SOLUTION ORAL DAILY
Status: DISCONTINUED | OUTPATIENT
Start: 2019-08-14 | End: 2019-08-16 | Stop reason: HOSPADM

## 2019-08-13 RX ORDER — SODIUM CHLORIDE 9 MG/ML
125 INJECTION, SOLUTION INTRAVENOUS CONTINUOUS
Status: DISPENSED | OUTPATIENT
Start: 2019-08-13 | End: 2019-08-14

## 2019-08-13 RX ORDER — ESCITALOPRAM OXALATE 10 MG/1
20 TABLET ORAL
Status: DISCONTINUED | OUTPATIENT
Start: 2019-08-14 | End: 2019-08-16 | Stop reason: HOSPADM

## 2019-08-13 RX ORDER — FLUMAZENIL 0.1 MG/ML
0.2 INJECTION INTRAVENOUS
Status: DISCONTINUED | OUTPATIENT
Start: 2019-08-13 | End: 2019-08-13 | Stop reason: HOSPADM

## 2019-08-13 RX ORDER — FENTANYL CITRATE 50 UG/ML
INJECTION, SOLUTION INTRAMUSCULAR; INTRAVENOUS AS NEEDED
Status: DISCONTINUED | OUTPATIENT
Start: 2019-08-13 | End: 2019-08-13 | Stop reason: HOSPADM

## 2019-08-13 RX ORDER — MIDAZOLAM HYDROCHLORIDE 1 MG/ML
2 INJECTION, SOLUTION INTRAMUSCULAR; INTRAVENOUS
Status: DISCONTINUED | OUTPATIENT
Start: 2019-08-13 | End: 2019-08-13 | Stop reason: HOSPADM

## 2019-08-13 RX ORDER — OXYCODONE HYDROCHLORIDE 5 MG/1
5 TABLET ORAL
Status: DISCONTINUED | OUTPATIENT
Start: 2019-08-13 | End: 2019-08-16 | Stop reason: HOSPADM

## 2019-08-13 RX ORDER — NALOXONE HYDROCHLORIDE 0.4 MG/ML
0.4 INJECTION, SOLUTION INTRAMUSCULAR; INTRAVENOUS; SUBCUTANEOUS AS NEEDED
Status: DISCONTINUED | OUTPATIENT
Start: 2019-08-13 | End: 2019-08-16 | Stop reason: HOSPADM

## 2019-08-13 RX ORDER — LIDOCAINE HYDROCHLORIDE 10 MG/ML
0.1 INJECTION, SOLUTION EPIDURAL; INFILTRATION; INTRACAUDAL; PERINEURAL AS NEEDED
Status: DISCONTINUED | OUTPATIENT
Start: 2019-08-13 | End: 2019-08-13 | Stop reason: HOSPADM

## 2019-08-13 RX ORDER — SODIUM CHLORIDE 0.9 % (FLUSH) 0.9 %
5-40 SYRINGE (ML) INJECTION AS NEEDED
Status: DISCONTINUED | OUTPATIENT
Start: 2019-08-13 | End: 2019-08-16 | Stop reason: HOSPADM

## 2019-08-13 RX ORDER — LIDOCAINE HYDROCHLORIDE 20 MG/ML
INJECTION, SOLUTION EPIDURAL; INFILTRATION; INTRACAUDAL; PERINEURAL AS NEEDED
Status: DISCONTINUED | OUTPATIENT
Start: 2019-08-13 | End: 2019-08-13 | Stop reason: HOSPADM

## 2019-08-13 RX ORDER — MIDAZOLAM HYDROCHLORIDE 1 MG/ML
INJECTION, SOLUTION INTRAMUSCULAR; INTRAVENOUS AS NEEDED
Status: DISCONTINUED | OUTPATIENT
Start: 2019-08-13 | End: 2019-08-13 | Stop reason: HOSPADM

## 2019-08-13 RX ORDER — PROPOFOL 10 MG/ML
INJECTION, EMULSION INTRAVENOUS
Status: DISCONTINUED | OUTPATIENT
Start: 2019-08-13 | End: 2019-08-13 | Stop reason: HOSPADM

## 2019-08-13 RX ORDER — ACETAMINOPHEN 500 MG
1000 TABLET ORAL EVERY 6 HOURS
Status: DISCONTINUED | OUTPATIENT
Start: 2019-08-13 | End: 2019-08-16 | Stop reason: HOSPADM

## 2019-08-13 RX ORDER — GLYCOPYRROLATE 0.2 MG/ML
INJECTION INTRAMUSCULAR; INTRAVENOUS AS NEEDED
Status: DISCONTINUED | OUTPATIENT
Start: 2019-08-13 | End: 2019-08-13 | Stop reason: HOSPADM

## 2019-08-13 RX ORDER — FACIAL-BODY WIPES
10 EACH TOPICAL DAILY PRN
Status: DISCONTINUED | OUTPATIENT
Start: 2019-08-15 | End: 2019-08-16 | Stop reason: HOSPADM

## 2019-08-13 RX ORDER — OXYCODONE HYDROCHLORIDE 5 MG/1
10 TABLET ORAL
Status: DISCONTINUED | OUTPATIENT
Start: 2019-08-13 | End: 2019-08-16 | Stop reason: HOSPADM

## 2019-08-13 RX ADMIN — ROCURONIUM BROMIDE 10 MG: 50 INJECTION, SOLUTION INTRAVENOUS at 09:49

## 2019-08-13 RX ADMIN — HYDROMORPHONE HYDROCHLORIDE 0.5 MG: 1 INJECTION, SOLUTION INTRAMUSCULAR; INTRAVENOUS; SUBCUTANEOUS at 15:37

## 2019-08-13 RX ADMIN — KETAMINE HYDROCHLORIDE 10 MG: 10 INJECTION, SOLUTION INTRAMUSCULAR; INTRAVENOUS at 10:00

## 2019-08-13 RX ADMIN — SUCCINYLCHOLINE CHLORIDE 60 MG: 20 INJECTION, SOLUTION INTRAMUSCULAR; INTRAVENOUS; PARENTERAL at 07:59

## 2019-08-13 RX ADMIN — GLYCOPYRROLATE 0.4 MG: 0.2 INJECTION, SOLUTION INTRAMUSCULAR; INTRAVENOUS at 14:18

## 2019-08-13 RX ADMIN — ROSUVASTATIN CALCIUM 20 MG: 10 TABLET, COATED ORAL at 21:08

## 2019-08-13 RX ADMIN — ROCURONIUM BROMIDE 10 MG: 50 INJECTION, SOLUTION INTRAVENOUS at 07:59

## 2019-08-13 RX ADMIN — HYDROMORPHONE HYDROCHLORIDE 0.5 MG: 1 INJECTION, SOLUTION INTRAMUSCULAR; INTRAVENOUS; SUBCUTANEOUS at 16:09

## 2019-08-13 RX ADMIN — Medication 2 MG: at 14:18

## 2019-08-13 RX ADMIN — FENTANYL CITRATE 100 MCG: 50 INJECTION, SOLUTION INTRAMUSCULAR; INTRAVENOUS at 12:00

## 2019-08-13 RX ADMIN — KETOROLAC TROMETHAMINE 30 MG: 30 INJECTION, SOLUTION INTRAMUSCULAR at 20:20

## 2019-08-13 RX ADMIN — PROPOFOL 80 MCG/KG/MIN: 10 INJECTION, EMULSION INTRAVENOUS at 07:57

## 2019-08-13 RX ADMIN — ROCURONIUM BROMIDE 20 MG: 50 INJECTION, SOLUTION INTRAVENOUS at 11:21

## 2019-08-13 RX ADMIN — ROCURONIUM BROMIDE 20 MG: 50 INJECTION, SOLUTION INTRAVENOUS at 08:48

## 2019-08-13 RX ADMIN — CYCLOBENZAPRINE HYDROCHLORIDE 10 MG: 10 TABLET, FILM COATED ORAL at 21:22

## 2019-08-13 RX ADMIN — KETOROLAC TROMETHAMINE 30 MG: 30 INJECTION, SOLUTION INTRAMUSCULAR at 23:04

## 2019-08-13 RX ADMIN — Medication 2 G: at 21:08

## 2019-08-13 RX ADMIN — DEXAMETHASONE SODIUM PHOSPHATE 8 MG: 4 INJECTION, SOLUTION INTRAMUSCULAR; INTRAVENOUS at 08:20

## 2019-08-13 RX ADMIN — GLYCOPYRROLATE 0.1 MG: 0.2 INJECTION, SOLUTION INTRAMUSCULAR; INTRAVENOUS at 12:37

## 2019-08-13 RX ADMIN — CEFAZOLIN 1 G: 1 INJECTION, POWDER, FOR SOLUTION INTRAMUSCULAR; INTRAVENOUS at 12:18

## 2019-08-13 RX ADMIN — HYDROMORPHONE HYDROCHLORIDE 1 MG: 2 INJECTION INTRAMUSCULAR; INTRAVENOUS; SUBCUTANEOUS at 11:57

## 2019-08-13 RX ADMIN — SODIUM CHLORIDE, POTASSIUM CHLORIDE, SODIUM LACTATE AND CALCIUM CHLORIDE: 600; 310; 30; 20 INJECTION, SOLUTION INTRAVENOUS at 13:00

## 2019-08-13 RX ADMIN — OXYCODONE HYDROCHLORIDE 5 MG: 5 TABLET ORAL at 23:04

## 2019-08-13 RX ADMIN — SODIUM CHLORIDE, SODIUM LACTATE, POTASSIUM CHLORIDE, AND CALCIUM CHLORIDE 125 ML/HR: 600; 310; 30; 20 INJECTION, SOLUTION INTRAVENOUS at 06:57

## 2019-08-13 RX ADMIN — Medication 10 ML: at 23:05

## 2019-08-13 RX ADMIN — SODIUM CHLORIDE, POTASSIUM CHLORIDE, SODIUM LACTATE AND CALCIUM CHLORIDE: 600; 310; 30; 20 INJECTION, SOLUTION INTRAVENOUS at 07:00

## 2019-08-13 RX ADMIN — SODIUM CHLORIDE, POTASSIUM CHLORIDE, SODIUM LACTATE AND CALCIUM CHLORIDE: 600; 310; 30; 20 INJECTION, SOLUTION INTRAVENOUS at 07:59

## 2019-08-13 RX ADMIN — FENTANYL CITRATE 100 MCG: 50 INJECTION, SOLUTION INTRAMUSCULAR; INTRAVENOUS at 08:54

## 2019-08-13 RX ADMIN — HYDROMORPHONE HYDROCHLORIDE 0.5 MG: 1 INJECTION, SOLUTION INTRAMUSCULAR; INTRAVENOUS; SUBCUTANEOUS at 15:54

## 2019-08-13 RX ADMIN — SODIUM CHLORIDE, POTASSIUM CHLORIDE, SODIUM LACTATE AND CALCIUM CHLORIDE: 600; 310; 30; 20 INJECTION, SOLUTION INTRAVENOUS at 09:00

## 2019-08-13 RX ADMIN — CEFAZOLIN 2 G: 1 INJECTION, POWDER, FOR SOLUTION INTRAMUSCULAR; INTRAVENOUS at 08:30

## 2019-08-13 RX ADMIN — FENTANYL CITRATE 100 MCG: 50 INJECTION, SOLUTION INTRAMUSCULAR; INTRAVENOUS at 08:29

## 2019-08-13 RX ADMIN — FAMOTIDINE 20 MG: 10 INJECTION, SOLUTION INTRAVENOUS at 08:10

## 2019-08-13 RX ADMIN — MIDAZOLAM HYDROCHLORIDE 3 MG: 1 INJECTION, SOLUTION INTRAMUSCULAR; INTRAVENOUS at 07:50

## 2019-08-13 RX ADMIN — FENTANYL CITRATE 100 MCG: 50 INJECTION, SOLUTION INTRAMUSCULAR; INTRAVENOUS at 09:14

## 2019-08-13 RX ADMIN — KETAMINE HYDROCHLORIDE 10 MG: 10 INJECTION, SOLUTION INTRAMUSCULAR; INTRAVENOUS at 11:00

## 2019-08-13 RX ADMIN — CEFAZOLIN 0.2 G: 1 INJECTION, POWDER, FOR SOLUTION INTRAMUSCULAR; INTRAVENOUS; PARENTERAL at 06:54

## 2019-08-13 RX ADMIN — SODIUM CHLORIDE 125 ML/HR: 900 INJECTION, SOLUTION INTRAVENOUS at 15:34

## 2019-08-13 RX ADMIN — ONDANSETRON 4 MG: 2 INJECTION INTRAMUSCULAR; INTRAVENOUS at 23:04

## 2019-08-13 RX ADMIN — KETAMINE HYDROCHLORIDE 10 MG: 10 INJECTION, SOLUTION INTRAMUSCULAR; INTRAVENOUS at 12:00

## 2019-08-13 RX ADMIN — FENTANYL CITRATE 100 MCG: 50 INJECTION, SOLUTION INTRAMUSCULAR; INTRAVENOUS at 07:57

## 2019-08-13 RX ADMIN — KETAMINE HYDROCHLORIDE 10 MG: 10 INJECTION, SOLUTION INTRAMUSCULAR; INTRAVENOUS at 13:00

## 2019-08-13 RX ADMIN — KETAMINE HYDROCHLORIDE 30 MG: 10 INJECTION, SOLUTION INTRAMUSCULAR; INTRAVENOUS at 08:50

## 2019-08-13 RX ADMIN — PROPOFOL 120 MG: 10 INJECTION, EMULSION INTRAVENOUS at 07:57

## 2019-08-13 RX ADMIN — GLYCOPYRROLATE 0.2 MG: 0.2 INJECTION, SOLUTION INTRAMUSCULAR; INTRAVENOUS at 07:57

## 2019-08-13 RX ADMIN — FAMOTIDINE 20 MG: 20 TABLET ORAL at 20:20

## 2019-08-13 RX ADMIN — ONDANSETRON 4 MG: 2 INJECTION INTRAMUSCULAR; INTRAVENOUS at 08:19

## 2019-08-13 RX ADMIN — LIDOCAINE HYDROCHLORIDE 60 MG: 20 INJECTION, SOLUTION INTRAVENOUS at 07:57

## 2019-08-13 RX ADMIN — ROCURONIUM BROMIDE 20 MG: 50 INJECTION, SOLUTION INTRAVENOUS at 12:06

## 2019-08-13 RX ADMIN — ROCURONIUM BROMIDE 20 MG: 50 INJECTION, SOLUTION INTRAVENOUS at 08:52

## 2019-08-13 RX ADMIN — ACETAMINOPHEN 1000 MG: 500 TABLET ORAL at 20:19

## 2019-08-13 RX ADMIN — MIDAZOLAM HYDROCHLORIDE 2 MG: 1 INJECTION, SOLUTION INTRAMUSCULAR; INTRAVENOUS at 07:57

## 2019-08-13 RX ADMIN — KETAMINE HYDROCHLORIDE 10 MG: 10 INJECTION, SOLUTION INTRAMUSCULAR; INTRAVENOUS at 14:00

## 2019-08-13 RX ADMIN — HYDROMORPHONE HYDROCHLORIDE 0.5 MG: 1 INJECTION, SOLUTION INTRAMUSCULAR; INTRAVENOUS; SUBCUTANEOUS at 15:46

## 2019-08-13 RX ADMIN — ONDANSETRON 4 MG: 2 INJECTION INTRAMUSCULAR; INTRAVENOUS at 20:19

## 2019-08-13 RX ADMIN — ONDANSETRON 4 MG: 2 INJECTION INTRAMUSCULAR; INTRAVENOUS at 14:18

## 2019-08-13 RX ADMIN — Medication 10 MG: at 08:04

## 2019-08-13 NOTE — ANESTHESIA POSTPROCEDURE EVALUATION
Procedure(s):  L4-L5 OBLIQUE LUMBAR INTERBODY FUSION, L4-S1 POSTERIOR DECOMPRESSION, L4-5 POSTERIOR LUMBAR FUSION WITH INSTRUMENTATION, ILIAC CREST BONE MARROW ASPIRATE AND OSTEOAMP FIBERS WITH IMAGE GUIDANCE. general, total IV anesthesia    Anesthesia Post Evaluation      Multimodal analgesia: multimodal analgesia not used between 6 hours prior to anesthesia start to PACU discharge  Patient location during evaluation: PACU  Patient participation: complete - patient participated  Level of consciousness: sleepy but conscious  Pain score: 0  Pain management: adequate  Airway patency: patent  Anesthetic complications: no  Cardiovascular status: hemodynamically stable and acceptable  Respiratory status: acceptable  Hydration status: acceptable  Comments: Patient seen and evaluated; no concerns. Post anesthesia nausea and vomiting:  none      Vitals Value Taken Time   /90 8/13/2019  5:20 PM   Temp     Pulse 103 8/13/2019  5:22 PM   Resp 9 8/13/2019  5:22 PM   SpO2 96 % 8/13/2019  5:08 PM   Vitals shown include unvalidated device data.

## 2019-08-13 NOTE — BRIEF OP NOTE
BRIEF OPERATIVE NOTE    Date of Procedure: 8/13/2019   Preoperative Diagnosis: LUMBAR STENOSIS WITH NEUROGENIC CLAUDICATION  SPONDYLOLISTHESIS OF LUMBAR REGION  NEUROFORAMINAL STENOSIS OF LUMBAR SPINE  LOW BACK PAIN, UNSPECIFIED BACK PAIN LATERALITY, UNSPECIFIED CHRONICITY WITH SCIATICA PRESENCE UNSPECIFIED  Postoperative Diagnosis: LUMBAR STENOSIS WITH NEUROGENIC CLAUDICATION  SPONDYLOLISTHESIS OF LUMBAR REGION  NEUROFORAMINAL STENOSIS OF LUMBAR SPINE  LOW BACK PAIN, UNSPECIFIED BACK PAIN LATERALITY, UNSPECIFIED CHRONICITY WITH SCIATICA PRESENCE UNSPECIFIED    Procedure(s):  L4-L5 OBLIQUE LUMBAR INTERBODY FUSION, L4-S1 POSTERIOR DECOMPRESSION, L4-5 POSTERIOR LUMBAR FUSION WITH INSTRUMENTATION, ILIAC CREST BONE MARROW ASPIRATE AND OSTEOAMP FIBERS WITH IMAGE GUIDANCE  Surgeon(s) and Role:     * Dimple Martinez MD - Primary     * Johan Lord MD - Assisting         Surgical Assistant: Pia Jones NP    Surgical Staff:  Circ-1: Shubham Barragan RN  Circ-Relief: Joaquin Menon RN  Scrub Tech-1: John Lopez  Nurse Practitioner: Ivan Ward NP  Float Staff: Bambi Hilario RN  Event Time In Time Out   Incision Start 1854    Incision Close       Anesthesia: General   Estimated Blood Loss: 150 cc  Specimens: * No specimens in log *   Findings: moderately severe stenosis L4-5, foraminal stenosis L5-S1, spondylolisthesis   Complications: none  Implants:   Implant Name Type Inv.  Item Serial No.  Lot No. LRB No. Used Action   GRAFT BNE PUTTY BIOACTV 7.5GM -- SIGNAFUSE - SN/A  GRAFT BNE PUTTY BIOACTV 7.5GM -- Paul Queen [id number]  1 Implanted   GRAFT BNE PUTTY BIOACTV 7.5GM -- SIGNAFUSE - SN/A  GRAFT BNE PUTTY BIOACTV 7.5GM -- SIGNAFUSE N/A [employer] [id number]  1 Implanted   GRAFT BNE BIOACTV INTERFC 1GM -- INTERFACE - SN/A  GRAFT BNE BIOACTV INTERFC 1GM -- INTERFACE N/A [id number] [facility] [id number]  2 Implanted   OsteoAmp 10cc   [id number] [facility] [id number]  1 Implanted Bentley-L IBG system interbody fusion device, 18mm (w) 45mm (L) 11mm (H) 6degree   N/A [employer] [id number]  1 Implanted

## 2019-08-13 NOTE — ANESTHESIA PREPROCEDURE EVALUATION
Relevant Problems   No relevant active problems       Anesthetic History   No history of anesthetic complications  PONV          Review of Systems / Medical History  Patient summary reviewed, nursing notes reviewed and pertinent labs reviewed    Pulmonary  Within defined limits                 Neuro/Psych   Within defined limits           Cardiovascular  Within defined limits          Dysrhythmias : PVC      Exercise tolerance: >4 METS     GI/Hepatic/Renal  Within defined limits              Endo/Other  Within defined limits           Other Findings              Physical Exam    Airway  Mallampati: II    Neck ROM: normal range of motion   Mouth opening: Normal     Cardiovascular  Regular rate and rhythm,  S1 and S2 normal,  no murmur, click, rub, or gallop  Rhythm: regular  Rate: normal         Dental  No notable dental hx       Pulmonary  Breath sounds clear to auscultation               Abdominal  GI exam deferred       Other Findings            Anesthetic Plan    ASA: 2  Anesthesia type: general          Induction: Intravenous  Anesthetic plan and risks discussed with: Patient      Scop patch for PONV

## 2019-08-13 NOTE — OP NOTES
OPERATIVE REPORT                         DATE OF SERVICE:  08/13/19     SURGEON:  Antoine Strong MD     PREOPERATIVE DIAGNOSES:  1. Lumbar spinal stenosis L4-S1  2. Spondylolisthesis L4-5  3. Lumbar radiculopathy. POSTOPERATIVE DIAGNOSES:  1. Lumbar spinal stenosis L4-S1.  2. Spondylolisthesis L4-5  3. Lumbar radiculopathy. PROCEDURES:  1. Anterior oblique lumbar interbody fusion, L4-5.  2. Insertion of structural interbody cage from RTI Tetrafuse cage, 11mm height, 6 degree lordosis, 45 mm length  3. Iliac crest bone marrow aspirate from left iliac crest through separate  incision over spinal fusion. 4. Use of morselized allograft osteoamp, osteostrand and signafuse for interbody fusion and posterolateral and facet fusion  5. Use of stereotactic navigation 1DayLater O-Arm  6. Posterior laminectomy of L4 with medial facetectomy and foraminotomy  7. Posterior laminectomy of L5 with medial facetectomy and foramintomy  8. Posterior L4-5 fusion  9. L4-5 bilateral pedicle screw instrumentation with 1DayLater Solera,  5.5 x 35 mm at L4 and L5 on left, 5.5 x 30 mm at L4 and L5 on right. Cortical trajectory used. COMPLICATIONS:  None. SURGEON:  Dr. Toribio Vazquez and I were co-surgeons for anterior retroperitoneal oblique approach. Assistant:  Dr. Toribio Vazquez assisted me  with the spinal portion of the procedure with the interbody fusion, bone  Grafting , cage and instrumentation. I was primary for posterior fusion, laminectomy and instrumentation with Catherine Masterson NP as first assist.         Assistant: Catherine Masterson NP was present and scrubbed as assistant for bone grafting, interbody fusion and hardware placement. ESTIMATED BLOOD LOSS: 50 cc anterior, 100 cc posterior    Neuromonitoring:  SSEP and EMG utilized     ANESTHESIA: General   Specimens: None  Findings: moderately severe stenosis L4-5, mild foraminal L5-S1 stenosis on right. ,spondylolisthesis. INDICATIONS:  The patient is a very pleasant 60 y/o female with lumbar stenosis, spondylolisthesis and intractable back and leg pain. The patient had numbness in both legs, difficulty with standing and walking, symptoms present for over 2 years, failed treatment with bracing, medications, multiple SWATHI's, physical therapy. Plain x-rays demonstrated L4-5 grade 1 spondylolisthesis  and MRI demonstrated L4-S1 stenosis. The patient failed all  conservative measures. We discussed surgical intervention and patient wished to proceed. We discussed the oblique interbody fusion  to correct deformity, indirect decompression followed by a 2nd procedure of posterior instrumentation, possible laminectomy in the setting of persisting leg pain. The patient wished to proceed. We had discussed 2 surgical approaches, anterior and posterior. For anterior the patient did have an opportunity to meet Dr. Kaden Trejo to discuss risks and benefits and wished to proceed. Patient provided informed consent. We discussed risks and benefits including bleeding, infection, spinal fluid leaks, neurological injury, vascular or visceral injury, hernia, pseudoarthrosis, hardware failure, need for additional surgery as well as adjacent segment disease above or below fusion as well as other medical and anesthetic related complications including but not limited to DVT, PE, respiratory failure and stroke, death. After understanding risks/benefits, patient wished to proceed. DESCRIPTION OF PROCEDURE:  The patient was identified in the preoperative area, H and P and consent form  were updated. Anterior lumbar spine was marked as the site of surgery. Preop bilateral lumbar radiculitis. SCDs were applied in the preoperative area. The patient was taken to the operating room. General anesthetic was given. SSEP, free running EMG, neurologic monitoring electrodes were applied, and baselines was obtained.  The patient was gently placed into lateral decubitus position with copious padding of iliac crest, perineal areas and axillary roll. Summers cather placed under sterile conditions. The patient was then sterilely prepped and draped in usual standard fashion including alcohol followed by Betadine scrub and ChloraPrep. Patient received Ancef for antibiotic. We proceeded with time-out, which was confirmed by Anesthesia staff, OR staff, Dr. Tatum Parekh, and myself. I utilized flouroscopy to daljit the disk space on the skin. I  then proceeded with the oblique retroperitoneal exposure of L4-5 with Dr. Tatum Parekh. Oblique incision through the skin performed, then external oblique fascia identified and divided obliquely with cautery. Fibers of External oblique were then split with scissors and bluntly dissected. Internal oblique was identified and fibers split with scissors and bluntly dissected. Transversalis was identified then and bluntly dissected. Retroperitoneal fat was identified, then blunt dissection with finger was utilized to enlarge exposure, identifying the transverse process, quadratus, the belly of the psoas musculature. Lighted retractor blade then utilized to identify the anterior border of psoas muscle, then sponge stick utilized to sweep retroperitoneal fat and contents anteriorly. Once directly visualizing the corner of the disk space, we utilized the navigation probe to disk space at L4-5. I then placed the self-retaining retractor over a guidewire into the disk space. The retractor was then fixed to the table and guidewire removed. We then   mobilized the psoas posteriorly. The sympathetic chain was identified and retracted gently anteriorly. After the psoas was adequately mobilized, we then   utilized flouro guided instruments to identify borders for annulotomy. I then proceeded with the annulotomy. I then proceeded with diskectomy using curettes as well as luz and proceeded with thorough   diskectomy at L4-5 and endplate preparation.   I then utilized trials and a 11x45 mm implant had the best fit and fill and safe trajectories. At this point, I then utilized   a 11x45 mm cage. This was packed with morselized allograft and this was tamped into place utilizing real time flourscopy was also used to verify final safe placement of cage. We then proceeded with removal of retractors. Dr. Addy Awad inspected and there is no significant bleeding seen and with good hemostasis. There was no evidence of injury to retroperitoneal structures or visceral structures to direct visualization. Surgical wound was then copious irrigated, antibiotic powder placed and layered closure of transversus, then internal the external oblique was performed. Subcutaneous tissues closed with 2-0 vicryl suture and 3-0 stratafix used for subcuticular closure. Dermabond and sterile dressing were applied. The patient   tolerated the procedure well. Patient was then placed into the supine position, then logrolled onto the Wallback frame. All bony prominences were well padded including iliac crests, knees and ankles. No pressure on the eyes per anesthesia. Patient was prepped and draped in usual standard fashion. Antibiotics were re-dosed. I used flouro to daljit out the L4-S1 on the skin posteriorly. I made a skin incision from L4-S1. I exposed the posterior lumbar spine in standard fashion. I took intraoperative fluoroscopy to verify our levels. I exposed the pars of L4 and L5 bilaterally. I placed the medtronic spinous process clamp on L5 and obtained O-arm spin. I then used navigated bur and instruments to make  holes for cortical trajectory pedicle screws bilaterally at L4 and L5. I probed each  hole and no breach with good endpoints. I then placed my screws at L4 and L5 bilaterally with navigated instruments. I then stimulated all 4 pedicle screws with pedicle screw stimulation.  The pedicle screws were found to be within the appropriate range of amplitude. I then placed contoured rods on top of the pedicles bilaterally. The rods were locked to the pedicle screws according to the 's specification with set screws. I then began my decompression by performing a laminectomy of L4. I also performed a partial facetectomy and foraminotomy to decompress the thecal sac and nerve roots of L4. I also performed a laminectomy of L5 with bilateral partial facetectomy and foraminotomy to decompress the thecal sac and traversing L5 nerve roots. I decompressed the stenosis found within the foramen and lateral to the foramen for L4-L5 on the left side and right side. I then proceeded with furthering the laminectomy of L5 to perform foraminotomy at L5-S1, there was superior facet hypertrophy of S1 leading to foraminal stenosis on the Right. Foraminotomy performed at L5-S1 on right until largest Silvestre ball could be passed through. Postinstrumentation O-arm spin showed safe position of hardware posteriorly and cage anteriorly. I decorticated our fusion beds bilaterally with a high-speed cipriano. I placed our bone graft into our fusions beds bilaterally. The fusion beds of the decorticated facets of L4-5 were packed with morselized allograft and local autograft. We had good hemostasis. There was no CSF leaking. I injected the soft tissues with a pain management cocktail. A deep drain was placed. The wound was closed in layers. A sterile dressing was applied. The patient was extubated and transferred to the recovery room in good medical condition. Milo Quiroz NP was present and scrubbed for procedure as surgical assistant, she assisted Dr. Arabella Hatfield and I anteriorly, posteriorly she was my primary assistant and helped with exposure, retraction and wound closure.    Dictated By:  MD Vinnie Hleton MD

## 2019-08-13 NOTE — H&P
H&P Update:  Rachel Armstrong was seen and examined. History and physical has been reviewed. The patient has been examined. There have been no significant clinical changes since the completion of the originally dated History and Physical.  Patient identified by surgeon; surgical site was confirmed by patient and surgeon. Patient reports worsening bilateral leg pain and numbness to both feet and toes (big toe greater than smaller toes)  Cannot lie flat or stand up straight. Leg pain is equal right and left. Reviewed risks/benefits of surgery, also positioning and plan for anterior/posterior surgery today with patient and .

## 2019-08-13 NOTE — PROGRESS NOTES
Patient is seen in PACU and examined. Extubated in PACU, uneventfully. Pre op bilateral leg pain improved, denies numbness/tingling BLE. Post op pain is controlled. Vital stable  -   Sleepy, wakes up and follows commands.  - Dressing C/D   Hemovac holding suction and  Minimal output  - motor- strength positive BLE   sensory - intact     Stable post lumbar  L4-L5 OLIF and posterior instrumented fusion.    - Clear liquids  - control pain as needed. - SCD   - PT/OT tomorrow Am. - Lumbar orthoses.

## 2019-08-13 NOTE — PERIOP NOTES
Dr Malik Campbell updated on patient status , comfortable sleeping and a nasal trumpet placed to decrease snoring.

## 2019-08-13 NOTE — PROGRESS NOTES
Seen and eval'd in PACU  Patient still pretty sleepy but arousable  Denies leg pain  Reports some incisional pain  No numbness    Patient Vitals for the past 4 hrs:   Temp Pulse Resp BP SpO2   08/13/19 1545  83 12 141/80 100 %   08/13/19 1540  88 11 139/82 100 %   08/13/19 1535  82 12 133/79 99 %   08/13/19 1530  84 10 130/85 100 %   08/13/19 1525  73 10 128/77 100 %   08/13/19 1520  89 14 (!) 110/91 99 %   08/13/19 1515  84 10 115/70 99 %   08/13/19 1510  75 11 133/74 100 %   08/13/19 1505  74 10 125/73 100 %   08/13/19 1500  71 (!) 7 118/72 100 %   08/13/19 1455  73 12 121/72 100 %   08/13/19 1450 97.6 °F (36.4 °C) 66 8 105/62 100 %       Sleepy but following commands, but quickly falls asleep,  Demonstrates strong DF/PF bilaterally  Dressings anterior and posterior dry  SCD's in place  Sensation grossly intact to LT RLE and LLE    Stable postop  -reviewed surgery with family, answered questions  -periop antibiotics  -SCD's  -advance diet  -mobilize.  -hemovac and hernández overnight.

## 2019-08-13 NOTE — ROUTINE PROCESS
TRANSFER - OUT REPORT: 
 
Verbal report given to Khadijah REYEZ(name) on [place] Cazares  being transferred to [facility] 
(unit) for routine progression of care Report consisted of patients Situation, Background, Assessment and  
Recommendations(SBAR). Information from the following report(s) SBAR, Kardex, Procedure Summary, Intake/Output, MAR, Recent Results and Cardiac Rhythm NSR was reviewed with the receiving nurse. Lines:  
Peripheral IV 08/13/19 Left Hand (Active) Site Assessment Clean, dry, & intact 8/13/2019  2:54 PM  
Phlebitis Assessment 0 8/13/2019  2:54 PM  
Infiltration Assessment 0 8/13/2019  2:54 PM  
Dressing Status Clean, dry, & intact 8/13/2019  2:54 PM  
Dressing Type Transparent 8/13/2019  2:54 PM  
Hub Color/Line Status Pink 8/13/2019  2:54 PM  
Action Taken Open ports on tubing capped 8/13/2019  2:54 PM  
Alcohol Cap Used Yes 8/13/2019  2:54 PM  
   
Peripheral IV 08/13/19 Right Hand (Active) Site Assessment Clean, dry, & intact 8/13/2019  2:54 PM  
Phlebitis Assessment 0 8/13/2019  2:54 PM  
Infiltration Assessment 0 8/13/2019  2:54 PM  
Dressing Status Clean, dry, & intact 8/13/2019  2:54 PM  
Dressing Type Transparent 8/13/2019  2:54 PM  
Hub Color/Line Status Green 8/13/2019  2:54 PM  
Action Taken Open ports on tubing capped 8/13/2019  2:54 PM  
Alcohol Cap Used Yes 8/13/2019  2:54 PM  
  
 
Opportunity for questions and clarification was provided. Patient transported with: 
 O2 @ 2 liters Registered Nurse

## 2019-08-14 LAB
ANION GAP SERPL CALC-SCNC: 5 MMOL/L (ref 5–15)
BUN SERPL-MCNC: 7 MG/DL (ref 6–20)
BUN/CREAT SERPL: 12 (ref 12–20)
CALCIUM SERPL-MCNC: 8 MG/DL (ref 8.5–10.1)
CHLORIDE SERPL-SCNC: 103 MMOL/L (ref 97–108)
CO2 SERPL-SCNC: 28 MMOL/L (ref 21–32)
CREAT SERPL-MCNC: 0.6 MG/DL (ref 0.55–1.02)
GLUCOSE BLD STRIP.AUTO-MCNC: 69 MG/DL (ref 65–100)
GLUCOSE BLD STRIP.AUTO-MCNC: 71 MG/DL (ref 65–100)
GLUCOSE BLD STRIP.AUTO-MCNC: 87 MG/DL (ref 65–100)
GLUCOSE SERPL-MCNC: 99 MG/DL (ref 65–100)
HGB BLD-MCNC: 10.1 G/DL (ref 11.5–16)
POTASSIUM SERPL-SCNC: 4.1 MMOL/L (ref 3.5–5.1)
SERVICE CMNT-IMP: NORMAL
SODIUM SERPL-SCNC: 136 MMOL/L (ref 136–145)

## 2019-08-14 PROCEDURE — 97161 PT EVAL LOW COMPLEX 20 MIN: CPT

## 2019-08-14 PROCEDURE — 97530 THERAPEUTIC ACTIVITIES: CPT

## 2019-08-14 PROCEDURE — 74011250636 HC RX REV CODE- 250/636: Performed by: NURSE PRACTITIONER

## 2019-08-14 PROCEDURE — 74011250637 HC RX REV CODE- 250/637: Performed by: NURSE PRACTITIONER

## 2019-08-14 PROCEDURE — 97165 OT EVAL LOW COMPLEX 30 MIN: CPT

## 2019-08-14 PROCEDURE — 80048 BASIC METABOLIC PNL TOTAL CA: CPT

## 2019-08-14 PROCEDURE — 65270000029 HC RM PRIVATE

## 2019-08-14 PROCEDURE — 82962 GLUCOSE BLOOD TEST: CPT

## 2019-08-14 PROCEDURE — 97116 GAIT TRAINING THERAPY: CPT

## 2019-08-14 PROCEDURE — 97535 SELF CARE MNGMENT TRAINING: CPT

## 2019-08-14 PROCEDURE — 85018 HEMOGLOBIN: CPT

## 2019-08-14 PROCEDURE — 77010033678 HC OXYGEN DAILY

## 2019-08-14 PROCEDURE — 36415 COLL VENOUS BLD VENIPUNCTURE: CPT

## 2019-08-14 PROCEDURE — 94760 N-INVAS EAR/PLS OXIMETRY 1: CPT

## 2019-08-14 RX ADMIN — SENNOSIDES, DOCUSATE SODIUM 1 TABLET: 50; 8.6 TABLET, FILM COATED ORAL at 17:50

## 2019-08-14 RX ADMIN — CYCLOBENZAPRINE HYDROCHLORIDE 10 MG: 10 TABLET, FILM COATED ORAL at 17:50

## 2019-08-14 RX ADMIN — ACETAMINOPHEN 1000 MG: 500 TABLET ORAL at 12:21

## 2019-08-14 RX ADMIN — ROSUVASTATIN CALCIUM 20 MG: 10 TABLET, COATED ORAL at 21:31

## 2019-08-14 RX ADMIN — Medication 10 ML: at 21:33

## 2019-08-14 RX ADMIN — KETOROLAC TROMETHAMINE 30 MG: 30 INJECTION, SOLUTION INTRAMUSCULAR at 12:22

## 2019-08-14 RX ADMIN — OXYCODONE HYDROCHLORIDE 10 MG: 5 TABLET ORAL at 21:32

## 2019-08-14 RX ADMIN — FAMOTIDINE 20 MG: 20 TABLET ORAL at 09:08

## 2019-08-14 RX ADMIN — Medication 10 ML: at 02:45

## 2019-08-14 RX ADMIN — ACETAMINOPHEN 1000 MG: 500 TABLET ORAL at 02:46

## 2019-08-14 RX ADMIN — ONDANSETRON 4 MG: 2 INJECTION INTRAMUSCULAR; INTRAVENOUS at 02:45

## 2019-08-14 RX ADMIN — OXYCODONE HYDROCHLORIDE 10 MG: 5 TABLET ORAL at 02:46

## 2019-08-14 RX ADMIN — CYCLOBENZAPRINE HYDROCHLORIDE 10 MG: 10 TABLET, FILM COATED ORAL at 05:39

## 2019-08-14 RX ADMIN — SENNOSIDES, DOCUSATE SODIUM 1 TABLET: 50; 8.6 TABLET, FILM COATED ORAL at 09:08

## 2019-08-14 RX ADMIN — Medication 2 G: at 10:34

## 2019-08-14 RX ADMIN — OXYCODONE HYDROCHLORIDE 10 MG: 5 TABLET ORAL at 09:07

## 2019-08-14 RX ADMIN — Medication 2 G: at 02:45

## 2019-08-14 RX ADMIN — Medication 5 ML: at 14:00

## 2019-08-14 RX ADMIN — CHOLECALCIFEROL TAB 125 MCG (5000 UNIT) 5000 UNITS: 125 TAB at 09:08

## 2019-08-14 RX ADMIN — POLYETHYLENE GLYCOL 3350 17 G: 17 POWDER, FOR SOLUTION ORAL at 09:08

## 2019-08-14 RX ADMIN — FAMOTIDINE 20 MG: 20 TABLET ORAL at 17:50

## 2019-08-14 RX ADMIN — ESCITALOPRAM OXALATE 20 MG: 10 TABLET ORAL at 05:40

## 2019-08-14 RX ADMIN — METOCLOPRAMIDE HYDROCHLORIDE 10 MG: 10 TABLET ORAL at 05:40

## 2019-08-14 RX ADMIN — METOCLOPRAMIDE HYDROCHLORIDE 10 MG: 10 TABLET ORAL at 12:21

## 2019-08-14 RX ADMIN — ACETAMINOPHEN 1000 MG: 500 TABLET ORAL at 09:08

## 2019-08-14 RX ADMIN — KETOROLAC TROMETHAMINE 30 MG: 30 INJECTION, SOLUTION INTRAMUSCULAR at 05:41

## 2019-08-14 RX ADMIN — OXYCODONE HYDROCHLORIDE 10 MG: 5 TABLET ORAL at 17:50

## 2019-08-14 RX ADMIN — METOCLOPRAMIDE HYDROCHLORIDE 10 MG: 10 TABLET ORAL at 17:50

## 2019-08-14 NOTE — FACE TO FACE
Rounded on patient, f/u from Spine Pre-op Patient Education Class. Patient states class was valuable in preparing for surgery. Reviewed ice application, exercises and incentive spirometry use. Patient states their home space is prepared and safe. Questions answered.

## 2019-08-14 NOTE — PROGRESS NOTES
Rounded on [facility] patients and provided Communion and Anointing of the Sick at request of patient

## 2019-08-14 NOTE — PROGRESS NOTES
8/14/2019     2:06 PM  CM received acceptance from [facility]. CM to continue to monitor. 11:12 AM  Reason for Admission:   Elective - lumbar stenosis                    RRAT Score:          5           Plan for utilizing home health:      [facility] recommended. Current Advanced Directive/Advance Care Plan: Not on file. Pt deferred. Misael Linn Spouse [phone]                          Transition of Care Plan:                    CM met with pt for assessment. Demographics and PCP were confirmed. Pt is a 53year old,  female who lives in a private residence with her  and son (0 exterior steps, 21 interior steps). PTA, pt was able to complete ADLs without the use of DME. Pt has a rollator to assist during recovery. Pt is employed, and insured through [employer]. Pt has no prior hh, rehab or SNF. Pt prefers [facility] or [facility], and signed Pt Choice Letter. CM completed referral and is awaiting response. Servando Stovall MA    Care Management Interventions  PCP Verified by CM: Yes(Mike. No NN to notify. )  Palliative Care Criteria Met (RRAT>21 & CHF Dx)?: No  Mode of Transport at Discharge:  Other (see comment)()  Transition of Care Consult (CM Consult): Discharge Planning  MyChart Signup: No  Discharge Durable Medical Equipment: No  Physical Therapy Consult: Yes  Occupational Therapy Consult: Yes  Speech Therapy Consult: No  Current Support Network: Lives with Spouse  Confirm Follow Up Transport: Family  Plan discussed with Pt/Family/Caregiver: Yes  Freedom of Choice Offered: (S) Yes  Discharge Location  Discharge Placement: Home with home health

## 2019-08-14 NOTE — PROGRESS NOTES
ORTHOPAEDIC LUMBAR FUSION PROGRESS NOTE    NAME:     Sanam Cary   :       1965   MRN:       [id number]   DATE:      2019    POD:              1 Day Post-Op  S/P:              Procedure(s):  L4-L5 OBLIQUE LUMBAR INTERBODY FUSION, L4-S1 POSTERIOR DECOMPRESSION, L4-5 POSTERIOR LUMBAR FUSION WITH INSTRUMENTATION, ILIAC CREST BONE MARROW ASPIRATE AND OSTEOAMP FIBERS WITH IMAGE GUIDANCE    SUBJECTIVE:    Reports improvement in preop  Bilateral leg a pain and numbness. Reports mild numbness at the lower part of oblique incision. Postop pain controlled   Well with  oxycodone and toradol. Tolerating PO  Clear liquid intake  Well. Was  Nauseated early in the morning, controlled with medication   Did not get up yet. Denies nausea/vomiting, headache, chest pain or shortness of breath  Recent Labs     19  0244   HGB 10.1*      K 4.1      CO2 28   BUN 7   CREA 0.60   GLU 99     Patient Vitals for the past 12 hrs:   BP Temp Pulse Resp SpO2   19 0335 102/61 97.4 °F (36.3 °C) (!) 51 16 100 %   19 2330 99/61 98.2 °F (36.8 °C) (!) 53 16 100 %   19 2106 112/62 97.6 °F (36.4 °C) 60 18 100 %   19 1959 105/59 97.5 °F (36.4 °C) 63 18 100 %     Exam:  Positive strength/ROM bilat lower ext.,   Neuro intact to sensation  Dressings, lumbar and  Left lateral  clean and dry  Hemovac: holding suction, 30 ml output last shift. Lower extremities warm and well perfused      PLAN: 1 Day Post-Op, improved   Continue PO pain medications as needed  Continue SCD's, frequent ambulation   Discontinue hernández   Discontinue Hemovac ,if the out put is <50 ml at 3 pm   Continue liquid diet. Continue bowel regimen   Continue lumbar orthosis  Continue Vit D3  Medical comorbities stable  Discharge planning   Tomorrow if pain is controlled, PT cleared. Doing great, preop symptoms greatly improved. Plan per above.

## 2019-08-14 NOTE — PROGRESS NOTES
OCCUPATIONAL THERAPY EVALUATION/DISCHARGE  Patient: Linda Rojas (89 y.o. female)  Date: 8/14/2019  Primary Diagnosis: Lumbar stenosis with neurogenic claudication [M48.062]  Procedure(s) (LRB):  L4-L5 OBLIQUE LUMBAR INTERBODY FUSION, L4-S1 POSTERIOR DECOMPRESSION, L4-5 POSTERIOR LUMBAR FUSION WITH INSTRUMENTATION, ILIAC CREST BONE MARROW ASPIRATE AND OSTEOAMP FIBERS WITH IMAGE GUIDANCE (N/A) 1 Day Post-Op   Precautions:  Fall, WBAT, Back(brace with OOB)    ASSESSMENT  Based on the objective data described below, the patient presents with good overall activity tolerance on POD 1 of spinal surgery. Patient demonstrated good understanding of education provided regarding spinal precautions, brace application, adaptive ADL techniques, and safe transfer techniques including log roll for bed mobility. Patient tolerated transfers into the bathroom for toileting and able to complete LB ADLs with good tolerance following education for adaptive techniques and proper positioning. Pain well controlled and vitals stable for tx. Patient lives with her  and son who are available to assist 24/7. Patient has no further skilled OT needs and is cleared from OT services at this time. Current Level of Function (ADLs/self-care): Patient requires increased time + adaptive techniques (mod I) for LB ADLs and UB dressing including brace application; She requires CGA for ADL transfers with use of RW. Functional Outcome Measure: The patient scored 80/100 on the Barthel Index outcome measure. PLAN :  Recommend with staff: Patient to be OOB to chair a minimum 3x/day for 1 hour and transfer into the bathroom for toileting needs with contact guard assist.      Recommendation for discharge: (in order for the patient to meet his/her long term goals)  No skilled occupational therapy/ follow up rehabilitation needs identified at this time.     This discharge recommendation:  A follow-up discussion with the attending provider and/or case management is planned    Equipment recommendations for successful discharge: none       SUBJECTIVE:   Patient stated I feel so much better about this now.  re: LB dressing following education for adaptive techniques     OBJECTIVE DATA SUMMARY:   HISTORY:   Past Medical History:   Diagnosis Date    Anxiety     Atypical lobular hyperplasia of breast 2011    LEFT breast     Degenerative lumbar spinal stenosis     History of seasonal allergies     PONV (postoperative nausea and vomiting)     Pure hypercholesterolemia 3/18/2011    PVCs (premature ventricular contractions) 2009    Has resolved     Past Surgical History:   Procedure Laterality Date    HX BREAST BIOPSY Right 1996    RIGHT-Benign    HX BREAST BIOPSY Left 7/2011    LEFT-ADH    HX COLONOSCOPY N/A     HX GYN      reanastomosis of tubes    HX GYN  7/2012    Uterine ablation    HX HYSTERECTOMY N/A     HX ROTATOR CUFF REPAIR Right 2010       Prior Level of Function/Environment/Context: Patient lives with her . She was independent with ADLs and functional mobility however reports significant pain with movement at baseline. Expanded or extensive additional review of patient history:   Home Situation  Home Environment: Private residence  # Steps to Enter: 2  Rails to [employer]: No  One/Two Story Residence: Two story  # of Interior Steps: 12  Interior Rails: Both  Lift Chair Available: No  Living Alone: No  Support Systems: Spouse/Significant Other/Partner, Family member(s)  Patient Expects to be Discharged to[de-identified] Private residence  Current DME Used/Available at Home: Walker, rolling, Raised toilet seat  Tub or Shower Type: Shower    Hand dominance: Right    EXAMINATION OF PERFORMANCE DEFICITS:  Cognitive/Behavioral Status:  Neurologic State: Alert  Orientation Level: Oriented X4  Cognition: Appropriate decision making; Appropriate for age attention/concentration; Appropriate safety awareness  Perception: Appears intact  Perseveration: No perseveration noted  Safety/Judgement: Awareness of environment;Good awareness of safety precautions    Skin: Intact in the uppers    Edema: None noted in the uppers    Hearing: Auditory  Auditory Impairment: None    Vision/Perceptual:    Tracking: Able to track stimulus in all quadrants w/o difficulty    Diplopia: No    Acuity: Within Defined Limits       Range of Motion:  WDL in the uppers     Strength:  WDL in the uppers     Coordination:  Fine Motor Skills-Upper: Left Intact; Right Intact    Gross Motor Skills-Upper: Left Intact; Right Intact    Tone & Sensation:  Tone: normal  Sensation: intact    Balance:  Sitting: Intact  Standing: Intact; With support    Functional Mobility and Transfers for ADLs:  Bed Mobility:  Rolling: (pt was received up and remained up)  Scooting: Supervision    Transfers:  Sit to Stand: Contact guard assistance  Stand to Sit: Contact guard assistance  Bed to Chair: Contact guard assistance  Bathroom Mobility: Contact guard assistance  Toilet Transfer : Contact guard assistance    ADL Assessment:  Feeding: Independent    Oral Facial Hygiene/Grooming: Independent    Bathing: Modified independent    Upper Body Dressing: Modified independent    Lower Body Dressing: Modified independent    Toileting: Modified independent      Bathing: Patient instructed when bathing to not submerge wound in water, stand to shower or sponge bathe, cover wound with plastic and tape to ensure no water reaches bandage/wound without cues. Patient indicated understanding. Dressing brace: Patient instructed and demonstrated to don/doff velcro on brace using dominant side, keeping non-dominant side intact. Patient instructed and demonstrated in meantime of being able to stand with back against wall to don/doff brace, to don/doff seated using lap and bed/chair surface to support brace while manipulating.     Home safety: Patient instructed on home modifications and safety (raise height of ADL objects, appropriate height of chair surfaces, recliner safety, change of floor surfaces, clear pathways) to increase independence and fall prevention. Patient indicated understanding. Standing: Patient instructed to walk up to sink/counter top/surfaces, step into walker, square off while using objects, slide objects along surfaces, to increase adherence to back precautions and fall prevention. Patient instructed to increase amount of time standing in order to increase independence and tolerance with ADLs. Cognitive Retraining  Safety/Judgement: Awareness of environment;Good awareness of safety precautions    Functional Measure:  Barthel Index:    Bathin  Bladder: 10  Bowels: 10  Groomin  Dressing: 10  Feeding: 10  Mobility: 10  Stairs: 0  Toilet Use: 10  Transfer (Bed to Chair and Back): 10  Total: 80/100        Percentage of impairment   0%   1-19%   20-39%   40-59%   60-79%   80-99%   100%   Barthel Score 0-100 100 99-80 79-60 59-40 20-39 1-19   0     The Barthel ADL Index: Guidelines  1. The index should be used as a record of what a patient does, not as a record of what a patient could do. 2. The main aim is to establish degree of independence from any help, physical or verbal, however minor and for whatever reason. 3. The need for supervision renders the patient not independent. 4. A patient's performance should be established using the best available evidence. Asking the patient, friends/relatives and nurses are the usual sources, but direct observation and common sense are also important. However direct testing is not needed. 5. Usually the patient's performance over the preceding 24-48 hours is important, but occasionally longer periods will be relevant. 6. Middle categories imply that the patient supplies over 50 per cent of the effort. 7. Use of aids to be independent is allowed. Elvira Hagan., Barthel, D.W. ([id number]). Functional evaluation: the Barthel Index.  [address] [facility] ([address], J.KANIKA.M.F, Alexi Espino., [facility]., Meron Metzger. (1999). Measuring the change indisability after inpatient rehabilitation; comparison of the responsiveness of the Barthel Index and Functional [place] Measure. Journal of Neurology, Neurosurgery, and Psychiatry, 66(4), 481-404. ADDISON Abel, ED Tobias, & Connie Mccormack M.A. (2004.) Assessment of post-stroke quality of life in cost-effectiveness studies: The usefulness of the Barthel Index and the EuroQoL-5D. Quality of Life Research, 15, 528-60       Occupational Therapy Evaluation Charge Determination   History Examination Decision-Making   LOW Complexity : Brief history review  LOW Complexity : 1-3 performance deficits relating to physical, cognitive , or psychosocial skils that result in activity limitations and / or participation restrictions  LOW Complexity : No comorbidities that affect functional and no verbal or physical assistance needed to complete eval tasks       Based on the above components, the patient evaluation is determined to be of the following complexity level: LOW     Activity Tolerance:   Good  Please refer to the flowsheet for vital signs taken during this treatment. After treatment patient left in no apparent distress:    Sitting in chair and Call bell within reach    COMMUNICATION/EDUCATION:   The patients plan of care was discussed with: Physical Therapist, Registered Nurse and patient. .    Thank you for this referral.  DERRICK Posey/L  Time Calculation: 27 mins

## 2019-08-14 NOTE — PROGRESS NOTES
Problem: Mobility Impaired (Adult and Pediatric)  Goal: *Acute Goals and Plan of Care (Insert Text)  Description  FUNCTIONAL STATUS PRIOR TO ADMISSION: Patient was independent and active without use of DME.    HOME SUPPORT PRIOR TO ADMISSION: The patient lived with  and son but did not require assist.    Physical Therapy Goals  Initiated 8/14/2019  1. Patient will move from supine to sit and sit to supine  in bed with modified independence within 7 day(s). 2.  Patient will transfer from bed to chair and chair to bed with modified independence using the least restrictive device within 7 day(s). 3.  Patient will perform sit to stand with modified independence within 7 day(s). 4.  Patient will ambulate with modified independence for 150 feet with the least restrictive device within 7 day(s). 5.  Patient will ascend/descend 12 stairs with 2 handrail(s) with modified independence within 7 day(s). 8/14/2019 1636 by Wing Sims PT, DPT  Outcome: Progressing Towards Goal  Note:   PHYSICAL THERAPY TREATMENT  Patient: Mauro Houston (80 y.o. female)  Date: 8/14/2019  Diagnosis: Lumbar stenosis with neurogenic claudication [M48.062] <principal problem not specified>  Procedure(s) (LRB):  L4-L5 OBLIQUE LUMBAR INTERBODY FUSION, L4-S1 POSTERIOR DECOMPRESSION, L4-5 POSTERIOR LUMBAR FUSION WITH INSTRUMENTATION, ILIAC CREST BONE MARROW ASPIRATE AND OSTEOAMP FIBERS WITH IMAGE GUIDANCE (N/A) 1 Day Post-Op  Precautions: Fall, WBAT, Back(brace with OOB) No bending, no lifting greater than 5 lbs, no twisting, log-roll technique, repositioning every 20-30 min except when sleeping, brace when OOB (if ordered)  Chart, physical therapy assessment, plan of care and goals were reviewed. ASSESSMENT  Patient able to increase ambulation distance to 150 feet with RW and also improved stability and performed all upright activity at a Supervision-SBA level. Patient without loss of balance or instability.   She is progressing and will be ready for stair training in the AM.    Current Level of Function Impacting Discharge (mobility/balance): RW for balance    Other factors to consider for discharge: 2 story home, 12 steps         PLAN :  Patient continues to benefit from skilled intervention to address the above impairments. Continue treatment per established plan of care. to address goals. Recommendation for discharge: (in order for the patient to meet his/her long term goals)  To be determined: per MD     This discharge recommendation:  A follow-up discussion with the attending provider and/or case management is planned    Equipment recommendations for successful discharge (if) home: rolling walker       SUBJECTIVE:   Patient stated Alison Mccartney feel good.     OBJECTIVE DATA SUMMARY:   Critical Behavior:  Neurologic State: Alert  Orientation Level: Oriented X4  Cognition: Appropriate decision making, Appropriate for age attention/concentration, Appropriate safety awareness  Safety/Judgement: Awareness of environment, Good awareness of safety precautions    Spinal diagnosis intervention:  The patient stated 3/3 back precautions when prompted. Reviewed all 3 back precautions, log roll technique, and sitting for 30 minutes at a time. Reviewed back brace application and wear schedule. Brace donned with modified independence       Functional Mobility Training:    Bed Mobility:  Log Rolling: Minimum assistance  Supine to Sit: Minimum assistance     Scooting: Supervision        Transfers:  Sit to Stand: Supervision  Stand to Sit: Supervision        Bed to Chair: Stand-by assistance                    Balance:  Sitting: Intact  Standing: Intact; With support  Ambulation/Gait Training:  Distance (ft): 150 Feet (ft)  Assistive Device: Walker, rolling;Gait belt  Ambulation - Level of Assistance: Stand-by assistance     Gait Description (WDL): Exceptions to WDL                                         Stairs:               Therapeutic Exercises:   Seated: hip flexion, LAQ  Pain Rating:  None reported    Activity Tolerance:   Good  Please refer to the flowsheet for vital signs taken during this treatment. After treatment patient left in no apparent distress:   Sitting in chair, Call bell within reach and Caregiver / family present    COMMUNICATION/COLLABORATION:   The patients plan of care was discussed with: Registered Nurse    Chris Vang, PT, DPT   Time Calculation: 20 mins           8/14/2019 1033 by Kalen Rodríguez PT, DPT  Outcome: Progressing Towards Goal  Note:   PHYSICAL THERAPY EVALUATION  Patient: Lauri Díaz (13 y.o. female)  Date: 8/14/2019  Primary Diagnosis: Lumbar stenosis with neurogenic claudication [M48.062]  Procedure(s) (LRB):  L4-L5 OBLIQUE LUMBAR INTERBODY FUSION, L4-S1 POSTERIOR DECOMPRESSION, L4-5 POSTERIOR LUMBAR FUSION WITH INSTRUMENTATION, ILIAC CREST BONE MARROW ASPIRATE AND OSTEOAMP FIBERS WITH IMAGE GUIDANCE (N/A) 1 Day Post-Op   Precautions:   Fall, WBAT, Back(brace with OOB)      ASSESSMENT  Based on the objective data described below, the patient presents with decreased strength, balance and ROM following admission for elective back surgery. Patient is now POD #1 L4-5 oblique and posterior fusion. Patient was educated on all back precautions, log roll and back brace usage: she verbalized understanding. Patient was able to tolerate session, blood pressure has chronic low blood pressure. She was asymptomatic with activity, and blood pressure improved with activity: see below. Patient overall requiring RW for stability, but able to tolerate all activity without complications. .    Current Level of Function Impacting Discharge (mobility/balance): CGA-MIN A for transfers and ambulation with RW    Functional Outcome Measure: The patient scored 80/100 on the Barthel Index outcome measure .       Other factors to consider for discharge: none     Patient will benefit from skilled therapy intervention to address the above noted impairments. PLAN :  Recommendations and Planned Interventions: bed mobility training, transfer training, gait training, therapeutic exercises, neuromuscular re-education and therapeutic activities      Frequency/Duration: Patient will be followed by physical therapy:  twice daily to address goals. Recommendation for discharge: (in order for the patient to meet his/her long term goals)  Physical therapy at least 2 days/week in the home     This discharge recommendation:  A follow-up discussion with the attending provider and/or case management is planned    Equipment recommendations for successful discharge (if) home: patient owns rollator, would benefit from RW- will continue to discuss with patient          SUBJECTIVE:   Patient stated Gilberto Gilford am glad that hernández came out.     OBJECTIVE DATA SUMMARY:   HISTORY:    Past Medical History:   Diagnosis Date    Anxiety     Atypical lobular hyperplasia of breast 2011    LEFT breast     Degenerative lumbar spinal stenosis     History of seasonal allergies     PONV (postoperative nausea and vomiting)     Pure hypercholesterolemia 3/18/2011    PVCs (premature ventricular contractions) 2009    Has resolved     Past Surgical History:   Procedure Laterality Date    HX BREAST BIOPSY Right 1996    RIGHT-Benign    HX BREAST BIOPSY Left 7/2011    LEFT-ADH    HX COLONOSCOPY N/A     HX GYN      reanastomosis of tubes    HX GYN  7/2012    Uterine ablation    HX HYSTERECTOMY N/A     HX ROTATOR CUFF REPAIR Right 2010       Personal factors and/or comorbidities impacting plan of care: see below    Home Situation  Home Environment: Private residence  # Steps to Enter: 2  Rails to Enter: No  One/Two Story Residence: Two story  # of Interior Steps: 12  Interior Rails: Both  Lift Chair Available: No  Living Alone: No  Support Systems: Spouse/Significant Other/Partner, Family member(s)  Patient Expects to be Discharged to[employer] residence  Current DME Used/Available at Home: Walker, rolling, Raised toilet seat  Tub or Shower Type: Shower    EXAMINATION/PRESENTATION/DECISION MAKING:   Critical Behavior:  Neurologic State: Alert  Orientation Level: Oriented X4  Cognition: Appropriate decision making, Appropriate for age attention/concentration, Appropriate safety awareness  Safety/Judgement: Awareness of environment, Good awareness of safety precautions  Hearing: Auditory  Auditory Impairment: None  Skin:  all exposed intact, hemovac drain in place  Edema: none noted  Range Of Motion:  AROM: Within functional limits           PROM: Within functional limits           Strength:    Strength: Generally decreased, functional                    Tone & Sensation:   Tone: Normal              Sensation: Intact               Coordination:  Coordination: Within functional limits  Vision:   Tracking: Able to track stimulus in all quadrants w/o difficulty  Diplopia: No  Acuity: Within Defined Limits  Functional Mobility:  Bed Mobility:  Rolling: Minimum assistance  Supine to Sit: Minimum assistance     Scooting: Supervision  Transfers:  Sit to Stand: Contact guard assistance  Stand to Sit: Contact guard assistance        Bed to Chair: Contact guard assistance              Balance:   Sitting: Intact  Standing: Intact; With support  Ambulation/Gait Training:  Distance (ft): 45 Feet (ft)  Assistive Device: Walker, rolling;Gait belt;Brace/Splint  Ambulation - Level of Assistance: Contact guard assistance     Gait Description (WDL): Exceptions to WDL                                          Stairs:               Therapeutic Exercises:       Functional Measure:  Barthel Index:    Bathin  Bladder: 10  Bowels: 10  Groomin  Dressing: 10  Feeding: 10  Mobility: 10  Stairs: 0  Toilet Use: 10  Transfer (Bed to Chair and Back): 10  Total: 80/100       Percentage of impairment   0%   1-19%   20-39%   40-59%   60-79%   80-99%   100%   Barthel Score 0-100 100 99-80 79-60 59-40 20-39 1-19   0     The Barthel ADL Index: Guidelines  1. The index should be used as a record of what a patient does, not as a record of what a patient could do. 2. The main aim is to establish degree of independence from any help, physical or verbal, however minor and for whatever reason. 3. The need for supervision renders the patient not independent. 4. A patient's performance should be established using the best available evidence. Asking the patient, friends/relatives and nurses are the usual sources, but direct observation and common sense are also important. However direct testing is not needed. 5. Usually the patient's performance over the preceding 24-48 hours is important, but occasionally longer periods will be relevant. 6. Middle categories imply that the patient supplies over 50 per cent of the effort. 7. Use of aids to be independent is allowed. Ricci Pollard., Barthel, D.W. ([id number]). Functional evaluation: the Barthel Index. [address] [facility] (14)2. Terrell Bliss delisa ABELARDO Joe, So Dickey, Demetra Huerta., [place], [address] [place] [address] (1999). Measuring the change indisability after inpatient rehabilitation; comparison of the responsiveness of the Barthel Index and Functional [place] Measure. Journal of Neurology, Neurosurgery, and Psychiatry, 66(4), 508-194. Edith Case, N.JERNA, ED Tobias, & Stanley Mendez M.A. (2004.) Assessment of post-stroke quality of life in cost-effectiveness studies: The usefulness of the Barthel Index and the EuroQoL-5D.  Quality of Life Research, 15, 646-44           Physical Therapy Evaluation Charge Determination   History Examination Presentation Decision-Making   HIGH Complexity :3+ comorbidities / personal factors will impact the outcome/ POC  HIGH Complexity : 4+ Standardized tests and measures addressing body structure, function, activity limitation and / or participation in recreation  LOW Complexity : Stable, uncomplicated  Other outcome measures Barthel Index  LOW       Based on the above components, the patient evaluation is determined to be of the following complexity level: LOW     Pain Rating:  None reported: patient was premedicated prior to session    Activity Tolerance:   Good  Please refer to the flowsheet for vital signs taken during this treatment. After treatment patient left in no apparent distress:   Sitting in chair, Call bell within reach and Bed / chair alarm activated    COMMUNICATION/EDUCATION:   The patients plan of care was discussed with: Occupational Therapist and Registered Nurse. Fall prevention education was provided and the patient/caregiver indicated understanding., Patient/family have participated as able in goal setting and plan of care. and Patient/family agree to work toward stated goals and plan of care.     Thank you for this referral.  Abhilash George, PT, DPT   Time Calculation: 30 mins

## 2019-08-14 NOTE — PROGRESS NOTES
Reports slight nausea. Pain level 0. Report to oncoming nurse. [employer] and [employer] drains credit for 7p-7a shift.

## 2019-08-15 LAB — HGB BLD-MCNC: 9.8 G/DL (ref 11.5–16)

## 2019-08-15 PROCEDURE — 77010033678 HC OXYGEN DAILY

## 2019-08-15 PROCEDURE — 65270000029 HC RM PRIVATE

## 2019-08-15 PROCEDURE — 85018 HEMOGLOBIN: CPT

## 2019-08-15 PROCEDURE — 74011250637 HC RX REV CODE- 250/637: Performed by: NURSE PRACTITIONER

## 2019-08-15 PROCEDURE — 94760 N-INVAS EAR/PLS OXIMETRY 1: CPT

## 2019-08-15 PROCEDURE — 97116 GAIT TRAINING THERAPY: CPT

## 2019-08-15 PROCEDURE — 36415 COLL VENOUS BLD VENIPUNCTURE: CPT

## 2019-08-15 PROCEDURE — 97530 THERAPEUTIC ACTIVITIES: CPT

## 2019-08-15 RX ORDER — FACIAL-BODY WIPES
10 EACH TOPICAL DAILY PRN
Status: DISCONTINUED | OUTPATIENT
Start: 2019-08-15 | End: 2019-08-16 | Stop reason: HOSPADM

## 2019-08-15 RX ADMIN — ACETAMINOPHEN 1000 MG: 500 TABLET ORAL at 06:23

## 2019-08-15 RX ADMIN — CYCLOBENZAPRINE HYDROCHLORIDE 10 MG: 10 TABLET, FILM COATED ORAL at 11:58

## 2019-08-15 RX ADMIN — ACETAMINOPHEN 1000 MG: 500 TABLET ORAL at 18:23

## 2019-08-15 RX ADMIN — Medication 10 ML: at 21:13

## 2019-08-15 RX ADMIN — OXYCODONE HYDROCHLORIDE 10 MG: 5 TABLET ORAL at 11:58

## 2019-08-15 RX ADMIN — ESCITALOPRAM OXALATE 20 MG: 10 TABLET ORAL at 06:24

## 2019-08-15 RX ADMIN — Medication 10 ML: at 06:24

## 2019-08-15 RX ADMIN — OXYCODONE HYDROCHLORIDE 10 MG: 5 TABLET ORAL at 16:49

## 2019-08-15 RX ADMIN — METOCLOPRAMIDE HYDROCHLORIDE 10 MG: 10 TABLET ORAL at 06:25

## 2019-08-15 RX ADMIN — FAMOTIDINE 20 MG: 20 TABLET ORAL at 16:48

## 2019-08-15 RX ADMIN — OXYCODONE HYDROCHLORIDE 10 MG: 5 TABLET ORAL at 03:41

## 2019-08-15 RX ADMIN — ROSUVASTATIN CALCIUM 20 MG: 10 TABLET, COATED ORAL at 21:12

## 2019-08-15 RX ADMIN — SENNOSIDES, DOCUSATE SODIUM 1 TABLET: 50; 8.6 TABLET, FILM COATED ORAL at 16:49

## 2019-08-15 RX ADMIN — BISACODYL 10 MG: 10 SUPPOSITORY RECTAL at 08:25

## 2019-08-15 RX ADMIN — CHOLECALCIFEROL TAB 125 MCG (5000 UNIT) 5000 UNITS: 125 TAB at 08:25

## 2019-08-15 RX ADMIN — POLYETHYLENE GLYCOL 3350 17 G: 17 POWDER, FOR SOLUTION ORAL at 08:25

## 2019-08-15 RX ADMIN — METOCLOPRAMIDE HYDROCHLORIDE 10 MG: 10 TABLET ORAL at 16:48

## 2019-08-15 RX ADMIN — SENNOSIDES, DOCUSATE SODIUM 1 TABLET: 50; 8.6 TABLET, FILM COATED ORAL at 08:25

## 2019-08-15 RX ADMIN — METOCLOPRAMIDE HYDROCHLORIDE 10 MG: 10 TABLET ORAL at 11:58

## 2019-08-15 RX ADMIN — FAMOTIDINE 20 MG: 20 TABLET ORAL at 08:25

## 2019-08-15 RX ADMIN — Medication 10 ML: at 16:49

## 2019-08-15 RX ADMIN — ACETAMINOPHEN 1000 MG: 500 TABLET ORAL at 11:58

## 2019-08-15 NOTE — PROGRESS NOTES
Nutrition Assessment:    RECOMMENDATIONS/INTERVENTION(S):   1. Advance diet as medically able. Goal Regular diet. 2. Recommend Gelatein TID (270 kcal, 60 gm protein) to promote PO/ protein intake. 3. Monitor PO intake, GI function, labs, and weight trends. ASSESSMENT:   8/15: 52 y/o F admitted with lumbar stenosis with neurogenic claudication. PMH - degenerative lumbar spinal stenosis. S/p L4-L5 OLIF and posterior instrumented fusion 8/13. Currently on CLD, pt reports poor appetite/ in-take, and feels burned out consuming CLD. Pt reports some abdominal cramping, no n/v. Pt has not passed flatus or BM. LBM 8/12, on bowel regimen. Pt reports good appetite PTA. Consuming x3 meals/ d with no problems/ concerns with intake.  lb. BMI 22.7 WNL. Pt reports minor fluctuations in weight. Noted pt with Stage 1 PI back lower documented. Pt agreeable to trial ONS at this time. Labs reviewed. Meds - cholecalciferol, famotidine, polyethylene glycol, senna-docusate. Diet Order: Clear liquids  % Eaten:    Patient Vitals for the past 72 hrs:   % Diet Eaten   08/13/19 1900 0 %       Pertinent Medications: [x] Reviewed    Labs: [x] Reviewed    Anthropometrics: Height: 5' 6\" (167.6 cm) Weight: 63.8 kg (140 lb 10.5 oz)    IBW (%IBW):   ( ) UBW (%UBW):   (  %)      BMI: Body mass index is 22.7 kg/m². This BMI is indicative of:   [] Underweight    [x] Normal    [] Overweight    []  Obesity    []  Extreme Obesity (BMI>40)  Estimated Nutrition Needs (Based on): 1625 Kcals/day(REE 1250 x 1.3) , 80 g(77 - 89 gm (1.2 - 1.4 gm/kg)) Protein  Carbohydrate: At Least 130 g/day  Fluids: 1625 mL/day (1 ml/kcal)    Last BM: 8/12   [x]Active     []Hyperactive  []Hypoactive       [] Absent   BS  Skin:    [] Intact   [] Incision  [x] Breakdown: Stage 1 PI back lower; wound abdomen L   [] DTI   [] Tears/Excoriation/Abrasion  []Edema [] Other:    Wt Readings from Last 30 Encounters:   08/13/19 63.8 kg (140 lb 10.5 oz)   07/30/19 63.8 kg (140 lb 11.2 oz)   06/20/17 63.5 kg (140 lb)   12/06/13 60.8 kg (134 lb)   11/06/13 58.1 kg (128 lb)   03/05/13 58.1 kg (128 lb)   10/01/12 61.2 kg (135 lb)   07/30/12 61.2 kg (135 lb)   08/29/11 59.9 kg (132 lb)   07/26/11 59.9 kg (132 lb)      NUTRITION DIAGNOSES:   Problem:  Inadequate energy intake     Etiology: related to decreased ability to consume sufficient energy     Signs/Symptoms: as evidenced by currently on CLD with minimal PO intake per pt report      NUTRITION INTERVENTIONS:  Meals/Snacks: General/healthful diet   Supplements: Commercial supplement              GOAL:   Pt will consume >50% meals + ONS with progression towards diet advancement within 3-5 days    Cultural, Sikh, or Ethnic Dietary Needs: None     EDUCATION & DISCHARGE NEEDS:    [x] None Identified   [] Identified and Education Provided/Documented   [] Identified and Pt declined/was not appropriate      [x] Interdisciplinary Care Plan Reviewed/Documented    [x] Discharge Needs:    Regular   [] No Nutrition Related Discharge Needs    NUTRITION RISK:   Pt Is At Nutrition Risk  [x]     No Nutrition Risk Identified  []       PT SEEN FOR:    []  MD Consult: []Calorie Count      []Diabetic Diet Education        []Diet Education     []Electrolyte Management     []General Nutrition Management and Supplements     []Management of Tube Feeding     []TPN Recommendations    []  RN Referral:  []MST score >=2     []Enteral/Parenteral Nutrition PTA     []Pregnant: Gestational DM or Multigestation                 [] Pressure Ulcer    []  Low BMI      [x]  Length of Stay       [] Dysphagia Diet         [] Ventilator  []  Follow-up     Previous Recommendations:   [] Implemented          [] Not Implemented          [x] Not Applicable    Previous Goal:   [] Met              [] Progressing Towards Goal              [] Not Progressing Towards Goal   [x] Not Applicable            Meribeth Seip, [address] [facility]  Pager [phone]  Phone [phone]

## 2019-08-15 NOTE — PROGRESS NOTES
Problem: Mobility Impaired (Adult and Pediatric)  Goal: *Acute Goals and Plan of Care (Insert Text)  Description  FUNCTIONAL STATUS PRIOR TO ADMISSION: Patient was independent and active without use of DME.    HOME SUPPORT PRIOR TO ADMISSION: The patient lived with  and son but did not require assist.    Physical Therapy Goals  Initiated 8/14/2019  1. Patient will move from supine to sit and sit to supine  in bed with modified independence within 7 day(s). 2.  Patient will transfer from bed to chair and chair to bed with modified independence using the least restrictive device within 7 day(s). 3.  Patient will perform sit to stand with modified independence within 7 day(s). 4.  Patient will ambulate with modified independence for 150 feet with the least restrictive device within 7 day(s). 5.  Patient will ascend/descend 12 stairs with 2 handrail(s) with modified independence within 7 day(s). Outcome: Progressing Towards Goal  Note:   PHYSICAL THERAPY TREATMENT  Patient: Ryann Jewell (71 y.o. female)  Date: 8/15/2019  Diagnosis: Lumbar stenosis with neurogenic claudication [M48.062] <principal problem not specified>  Procedure(s) (LRB):  L4-L5 OBLIQUE LUMBAR INTERBODY FUSION, L4-S1 POSTERIOR DECOMPRESSION, L4-5 POSTERIOR LUMBAR FUSION WITH INSTRUMENTATION, ILIAC CREST BONE MARROW ASPIRATE AND OSTEOAMP FIBERS WITH IMAGE GUIDANCE (N/A) 2 Days Post-Op  Precautions: Fall, WBAT, Back(brace with OOB) No bending, no lifting greater than 5 lbs, no twisting, log-roll technique, repositioning every 20-30 min except when sleeping, brace when OOB (if ordered)  Chart, physical therapy assessment, plan of care and goals were reviewed. ASSESSMENT  Patient able to increase ambulation distance to 250 feet with RW. She is now MOD I for all upright activity. She was able to negotiate 8 steps with 1 hand rail with good carry over of instruction and no loss of balance.   Patient is cleared to discharge and use of RW at all times with upright activities. Current Level of Function Impacting Discharge (mobility/balance): MOD I for ambulation and stair negotiation, using RW    Other factors to consider for discharge: none         PLAN :  Patient continues to benefit from skilled intervention to address the above impairments. Continue treatment per established plan of care. to address goals. Recommendation for discharge: (in order for the patient to meet his/her long term goals)  To be determined: per MD     This discharge recommendation:  A follow-up discussion with the attending provider and/or case management is planned    Equipment recommendations for successful discharge (if) home: rolling walker       SUBJECTIVE:   Patient stated Abdiaziz Novak had a rough night.     OBJECTIVE DATA SUMMARY:   Critical Behavior:  Neurologic State: Alert  Orientation Level: Oriented X4  Cognition: Appropriate decision making, Appropriate for age attention/concentration, Appropriate safety awareness  Safety/Judgement: Awareness of environment, Good awareness of safety precautions    Spinal diagnosis intervention:  The patient stated 3/3 back precautions when prompted. Reviewed all 3 back precautions, log roll technique, and sitting for 30 minutes at a time. Reviewed back brace application and wear schedule.  Brace donned with modified independence       Functional Mobility Training:    Bed Mobility:  Log Rolling: Independent  Supine to Sit: Independent  Sit to Supine: Independent           Transfers:  Sit to Stand: Modified independent  Stand to Sit: Modified independent        Bed to Chair: Modified independent                    Balance:     Ambulation/Gait Training:  Distance (ft): 250 Feet (ft)  Assistive Device: Walker, rolling;Gait belt  Ambulation - Level of Assistance: Modified independent                       Therapeutic Exercises:     Pain Rating:  None reported    Activity Tolerance:   Good  Please refer to the flowsheet for vital signs taken during this treatment.     After treatment patient left in no apparent distress:   Sitting in chair, Call bell within reach and Caregiver / family present    COMMUNICATION/COLLABORATION:   The patients plan of care was discussed with: Registered Nurse    Boris Kaye PT, DPT   Time Calculation: 27 mins

## 2019-08-15 NOTE — DIABETES MGMT
[facility]    [facility] Progress Note    Recommendations/ Comments: Chart reviewed for BG < 70 in non-diabetic patient. POC glucose 08/14/19 69 mg/dL @ 1242. No follow-up noted. POC had been trending down from fasting BG of  87 mg/dL yesterday. No labs noted today. Noted possible discharge today. If appropriate, please consider: DTC will continue to monitor. Current hospital DM medication: N/A    Chart reviewed on [address] [facility]. Patient is a 53 y.o. female with NO HISTORY of diabetes. A1c:   Lab Results   Component Value Date/Time    Hemoglobin A1c 5.6 07/30/2019 10:36 AM       Recent Glucose Results: No results found for: GLU, GLUPOC, GLUCPOC     Lab Results   Component Value Date/Time    Creatinine 0.60 08/14/2019 02:44 AM     Estimated Creatinine Clearance: 99.2 mL/min (based on SCr of 0.6 mg/dL). Active Orders   Diet    DIET CLEAR LIQUID        PO intake:   Patient Vitals for the past 72 hrs:   % Diet Eaten   08/13/19 1900 0 %       Will continue to follow as needed.     Thank you  Concepcion Essex, RN  Office [phone]  Pager [phone]          Time spent: 5 min

## 2019-08-15 NOTE — ADT AUTH CERT NOTES
Patient Demographics     Patient Name  Armand Dawson  [id number] Sex  Female   1965 Address  [address] [facility] [address] Phone  [phone] (Home)  [phone] (Work)  [phone] (Mobile) *Preferred*   CSN:   [id number]   Admit Date: Admit Time Room Bed   Aug 13, 2019  5:46  [18796] 01 [[id number]]   Attending Providers     Provider Pager From To   Eric Rueda MD  19    Emergency Contact(s)     Name Relation Home Work [address] Spouse [phone]   Utilization Reviews         Lumbar Diskectomy, Foraminotomy, or Laminotomy - Care Day 3 (8/15/2019) by Ramesh Ewing RN         Review Status Review Entered   Completed 8/15/2019 14:52       Criteria Review      Care Day: 3 Care Date: 8/15/2019 Level of Care: Inpatient Floor    Guideline Day 1    Level Of Care    (X) OR to floor    8/15/2019 14:52:47 EDT by Maggie Ray      8/15 ortho    Clinical Status    (X) * Clinical Indications met[B]    8/15/2019 14:52:47 EDT by Maggie Ray      98.3 63 115/74 16 97% ra   63.8 kg   pod #2   preop jer leg pain aand numbness some soreness lower back well controlled with flexeril   not passing flatus    Activity    ( ) Bed rest or standing and progressive ambulation postoperatively    8/15/2019 14:52:47 EDT by Maggie Ray      ambulating    Routes    (X) Oral or IV fluids, medications    8/15/2019 14:52:47 EDT by Maggie Ray      po tylenol 1000 mg q6h   po flexeril 10 mg 2x daily prn given x1   po oxycodone ir 10 mg q3h prn severe pain given x 2    (X) Diet as tolerated postoperatively    8/15/2019 14:52:47 EDT by Maggie Ray      clear liq diet    Medications    (X) Prophylactic antibiotics[C]    * Milestone   Additional Notes   8/15    Per attending    Positive strength/ROM bilat lower ext. Neuro intact to sensation     Abdomen soft  Bowel sounds +in all 4 quadrants. Dwaine Better soft and non tender.     Dressings clean and dry Hemovac: out put 25 ml last shift. Lower extremities warm and well perfused         PLAN: 2 Days Post-Op, improved    Continue PO pain medications as needed    Continue SCD's, frequent ambulation     Discontinue Hemovac. Diet: liquid    Continue bowel regimen , suppository this morning. Continue lumbar orthosis    Continue Vit D3    Medical comorbities stable    Discharge planning  Today if  Bowel functions returns and Patient clears PT       Lumbar Diskectomy, Foraminotomy, or Laminotomy - Care Day 2 (8/14/2019) by Ruby Hernandez RN         Review Status Review Entered   Completed 8/14/2019 12:14       Criteria Review      Care Day: 2 Care Date: 8/14/2019 Level of Care:    Guideline Day 1    Clinical Status    ( ) * Clinical Indications met[B]    Activity    (X) Bed rest or standing and progressive ambulation postoperatively    8/14/2019 12:14:24 EDT by Gris Paige with assist Q8h    Routes    (X) Oral or IV fluids, medications    8/14/2019 12:14:24 EDT by Cony Adair      senna 1 tab BID PO, miralax 17g PO QD, oxycodone 10mg Q3h PRN POx1, zofran 4mg Q4h PRN IVx1, reglan 10mg TID PO, toradol 30mg Q6h IV, pepcid 20mg BID PO, flexeril 10mg BID PO PRNx1, vitamin D3 5000 units PO QD, Tylenol 1g Q6h PO,    (X) Diet as tolerated postoperatively    8/14/2019 12:14:24 EDT by Cony Adair      Clear liquid diet    Medications    (X) Prophylactic antibiotics[C]    8/14/2019 12:14:24 EDT by Cony Adair      ancef 2g IV Q8h    * Milestone   Additional Notes   8/14/19:    Ortho: POD:  1 Day Post-Op    S/P: Procedure(s):L4-L5 OBLIQUE LUMBAR INTERBODY FUSION, L4-S1 POSTERIOR DECOMPRESSION, L4-5 POSTERIOR LUMBAR FUSION WITH INSTRUMENTATION, ILIAC CREST BONE MARROW ASPIRATE AND OSTEOAMP FIBERS WITH IMAGE GUIDANCE    Reports improvement in preop  Bilateral leg a pain and numbness. Reports mild numbness at the lower part of oblique incision. Postop pain controlled   Well with  oxycodone and toradol. Tolerating PO  Clear liquid intake  Well.     Was  Nauseated early in the morning, controlled with medication     Did not get up yet. Dressings, lumbar and  Left lateral  clean and dry    Hemovac: holding suction, 30 ml output last shift. Lower extremities warm and well perfused    VS: 97.8, 52, 14, 96%, 94/57    Labs: Ca 8.0, hgb 10.1    PLAN: 1 Day Post-Op, improved    Continue PO pain medications as needed    Continue SCD's, frequent ambulation     Discontinue hernández     Discontinue Hemovac ,if the out put is <50 ml at 3 pm     Continue liquid diet. Continue bowel regimen    Continue lumbar orthosis    Continue Vit D3    Medical comorbities stable    Discharge planning   Tomorrow if pain is controlled, PT cleared.     Doing great, preop symptoms greatly improved

## 2019-08-15 NOTE — PROGRESS NOTES
Problem: Falls - Risk of  Goal: *Absence of Falls  Description  Document Maria M Felix Fall Risk and appropriate interventions in the flowsheet. Outcome: Progressing Towards Goal  Note:   Fall Risk Interventions:            Medication Interventions: Bed/chair exit alarm    Elimination Interventions: Bed/chair exit alarm, Call light in reach              Problem: Patient Education: Go to Patient Education Activity  Goal: Patient/Family Education  Outcome: Progressing Towards Goal     Problem: Pressure Injury - Risk of  Goal: *Prevention of pressure injury  Description  Document Jose Luis Scale and appropriate interventions in the flowsheet.   Outcome: Progressing Towards Goal  Note:   Pressure Injury Interventions:  Sensory Interventions: Assess changes in LOC         Activity Interventions: Pressure redistribution bed/mattress(bed type)    Mobility Interventions: Pressure redistribution bed/mattress (bed type)    Nutrition Interventions: Document food/fluid/supplement intake                     Problem: Patient Education: Go to Patient Education Activity  Goal: Patient/Family Education  Outcome: Progressing Towards Goal     Problem: Patient Education: Go to Patient Education Activity  Goal: Patient/Family Education  Outcome: Progressing Towards Goal     Problem: Patient Education: Go to Patient Education Activity  Goal: Patient/Family Education  Outcome: Progressing Towards Goal     Problem: Surgical Pathway: Discharge Outcomes  Goal: *Hemodynamically stable  Outcome: Progressing Towards Goal  Goal: *Lungs clear or at baseline  Outcome: Progressing Towards Goal  Goal: *Demonstrates independent activity or return to baseline  Outcome: Progressing Towards Goal  Goal: *Optimal pain control at patient's stated goal  Outcome: Progressing Towards Goal  Goal: *Verbalizes understanding and describes prescribed diet  Outcome: Progressing Towards Goal  Goal: *Tolerating diet  Outcome: Progressing Towards Goal  Goal: *Verbalizes name, dosage, time, side effects, and number of days to continue medications  Outcome: Progressing Towards Goal  Goal: *No signs and symptoms of infection or wound complications  Outcome: Progressing Towards Goal  Goal: *Anxiety reduced or absent  Outcome: Progressing Towards Goal  Goal: *Understands and describes signs and symptoms to report to providers(Stroke Metric)  Outcome: Progressing Towards Goal  Goal: *Describes follow-up/return visits to physicians  Outcome: Progressing Towards Goal  Goal: *Describes available resources and support systems  Outcome: Progressing Towards Goal

## 2019-08-15 NOTE — PROGRESS NOTES
Bedside shift change report given to AISSATOU Vasquez (oncoming nurse) by Tamanna Ireland RN (offgoing nurse).  Report included the following information SBAR, Kardex, Intake/Output, MAR, Recent Results and Cardiac Rhythm NSR.

## 2019-08-15 NOTE — PROGRESS NOTES
Bedside shift change report given to AISSATOU Tijerina (oncoming nurse) by Earl Anglin RN (offgoing nurse).  Report included the following information SBAR, Kardex, Intake/Output, MAR, Recent Results and Cardiac Rhythm.

## 2019-08-15 NOTE — PROGRESS NOTES
ORTHOPAEDIC LUMBAR FUSION PROGRESS NOTE    NAME:     Franklin Vidales   :       1965   MRN:       [id number]   DATE:      8/15/2019    POD:              2 Days Post-Op  S/P:              Procedure(s):  L4-L5 OBLIQUE LUMBAR INTERBODY FUSION, L4-S1 POSTERIOR DECOMPRESSION, L4-5 POSTERIOR LUMBAR FUSION WITH INSTRUMENTATION, ILIAC CREST BONE MARROW ASPIRATE AND OSTEOAMP FIBERS WITH IMAGE GUIDANCE    SUBJECTIVE:    Reports improvement in preop  Bilateral leg pain and numbness, Reports some soreness in the lower back, well controlled with flexeril . reports some gaseous discomfort in the abdomen. Tolerating PO liquid  intake    walked in the [place] yesterday with PT Has not passed flatus yet,  Voiding  Without difficulty. Denies nausea/vomiting, headache, chest pain or shortness of breath  Recent Labs     08/15/19  0610 19  0244   HGB 9.8* 10.1*   NA  --  136   K  --  4.1   CL  --  103   CO2  --  28   BUN  --  7   CREA  --  0.60   GLU  --  99     Patient Vitals for the past 12 hrs:   BP Temp Pulse Resp SpO2   08/15/19 0318 98/64 98.6 °F (37 °C) 65 16 96 %   19 2323 97/58 98.3 °F (36.8 °C) 68 16 92 %     Exam:  Positive strength/ROM bilat lower ext. Neuro intact to sensation   Abdomen soft  Bowel sounds +in all 4 quadrants. .   Calves soft and non tender. Dressings clean and dry  Hemovac: out put 25 ml last shift. Lower extremities warm and well perfused      PLAN: 2 Days Post-Op, improved   Continue PO pain medications as needed  Continue SCD's, frequent ambulation   Discontinue Hemovac. Diet: liquid  Continue bowel regimen , suppository this morning.   Continue lumbar orthosis  Continue Vit D3  Medical comorbities stable   Discharge planning  Today if  Bowel functions returns and Patient clears PT.

## 2019-08-16 VITALS
TEMPERATURE: 98.4 F | BODY MASS INDEX: 22.6 KG/M2 | WEIGHT: 140.65 LBS | OXYGEN SATURATION: 93 % | SYSTOLIC BLOOD PRESSURE: 94 MMHG | HEIGHT: 66 IN | RESPIRATION RATE: 16 BRPM | DIASTOLIC BLOOD PRESSURE: 59 MMHG | HEART RATE: 71 BPM

## 2019-08-16 PROCEDURE — 74011250637 HC RX REV CODE- 250/637: Performed by: NURSE PRACTITIONER

## 2019-08-16 PROCEDURE — 94760 N-INVAS EAR/PLS OXIMETRY 1: CPT

## 2019-08-16 RX ORDER — CYCLOBENZAPRINE HCL 10 MG
10 TABLET ORAL
Qty: 60 TAB | Refills: 0 | Status: SHIPPED | OUTPATIENT
Start: 2019-08-16 | End: 2020-08-30

## 2019-08-16 RX ORDER — PSEUDOEPHEDRINE HCL 30 MG
500 TABLET ORAL DAILY
Qty: 60 TAB | Refills: 1 | Status: SHIPPED | OUTPATIENT
Start: 2019-08-16 | End: 2020-08-30

## 2019-08-16 RX ORDER — ACETAMINOPHEN 500 MG
1000 TABLET ORAL EVERY 6 HOURS
Qty: 60 TAB | Refills: 1 | Status: SHIPPED | OUTPATIENT
Start: 2019-08-16

## 2019-08-16 RX ORDER — OXYCODONE HYDROCHLORIDE 5 MG/1
5-10 TABLET ORAL
Qty: 56 TAB | Refills: 0 | Status: SHIPPED | OUTPATIENT
Start: 2019-08-16 | End: 2019-08-23

## 2019-08-16 RX ORDER — CHOLECALCIFEROL TAB 125 MCG (5000 UNIT) 125 MCG
5000 TAB ORAL DAILY
Qty: 60 TAB | Refills: 1 | Status: SHIPPED | OUTPATIENT
Start: 2019-08-16 | End: 2019-09-15

## 2019-08-16 RX ADMIN — SENNOSIDES, DOCUSATE SODIUM 1 TABLET: 50; 8.6 TABLET, FILM COATED ORAL at 08:17

## 2019-08-16 RX ADMIN — ACETAMINOPHEN 1000 MG: 500 TABLET ORAL at 06:34

## 2019-08-16 RX ADMIN — CHOLECALCIFEROL TAB 125 MCG (5000 UNIT) 5000 UNITS: 125 TAB at 08:17

## 2019-08-16 RX ADMIN — ESCITALOPRAM OXALATE 20 MG: 10 TABLET ORAL at 06:38

## 2019-08-16 RX ADMIN — METOCLOPRAMIDE HYDROCHLORIDE 10 MG: 10 TABLET ORAL at 06:34

## 2019-08-16 RX ADMIN — ACETAMINOPHEN 1000 MG: 500 TABLET ORAL at 00:14

## 2019-08-16 RX ADMIN — OXYCODONE HYDROCHLORIDE 10 MG: 5 TABLET ORAL at 08:17

## 2019-08-16 RX ADMIN — Medication 10 ML: at 06:35

## 2019-08-16 RX ADMIN — FAMOTIDINE 20 MG: 20 TABLET ORAL at 08:17

## 2019-08-16 NOTE — PROGRESS NOTES
Problem: Falls - Risk of  Goal: *Absence of Falls  Description  Document Travis Boeck Fall Risk and appropriate interventions in the flowsheet. Outcome: Resolved/Met  Note:   Fall Risk Interventions:            Medication Interventions: Bed/chair exit alarm    Elimination Interventions: Bed/chair exit alarm              Problem: Patient Education: Go to Patient Education Activity  Goal: Patient/Family Education  Outcome: Resolved/Met     Problem: Pressure Injury - Risk of  Goal: *Prevention of pressure injury  Description  Document Jose Luis Scale and appropriate interventions in the flowsheet.   Outcome: Resolved/Met  Note:   Pressure Injury Interventions:  Sensory Interventions: Assess changes in LOC         Activity Interventions: Pressure redistribution bed/mattress(bed type)    Mobility Interventions: Pressure redistribution bed/mattress (bed type)    Nutrition Interventions: Document food/fluid/supplement intake                     Problem: Patient Education: Go to Patient Education Activity  Goal: Patient/Family Education  Outcome: Resolved/Met     Problem: Patient Education: Go to Patient Education Activity  Goal: Patient/Family Education  Outcome: Resolved/Met     Problem: Patient Education: Go to Patient Education Activity  Goal: Patient/Family Education  Outcome: Resolved/Met     Problem: Surgical Pathway: Discharge Outcomes  Goal: *Hemodynamically stable  Outcome: Resolved/Met  Goal: *Lungs clear or at baseline  Outcome: Resolved/Met  Goal: *Demonstrates independent activity or return to baseline  Outcome: Resolved/Met  Goal: *Optimal pain control at patient's stated goal  Outcome: Resolved/Met  Goal: *Verbalizes understanding and describes prescribed diet  Outcome: Resolved/Met  Goal: *Tolerating diet  Outcome: Resolved/Met  Goal: *Verbalizes name, dosage, time, side effects, and number of days to continue medications  Outcome: Resolved/Met  Goal: *No signs and symptoms of infection or wound complications  Outcome: Resolved/Met  Goal: *Anxiety reduced or absent  Outcome: Resolved/Met  Goal: *Understands and describes signs and symptoms to report to providers(Stroke Metric)  Outcome: Resolved/Met  Goal: *Describes follow-up/return visits to physicians  Outcome: Resolved/Met  Goal: *Describes available resources and support systems  Outcome: Resolved/Met

## 2019-08-16 NOTE — PROGRESS NOTES
8/16/2019  10:29 AM  Pt DC noted. Pt will be followed by [facility], and DC clinicals were attached via [employer]. Pt's family will transport. No additional DC service needs indicated.

## 2019-08-16 NOTE — PROGRESS NOTES
ORTHOPAEDIC LUMBAR FUSION PROGRESS NOTE    NAME:     Lobo Spears   :       1965   MRN:       [id number]   DATE:      2019    POD:              3 Days Post-Op  S/P:              Procedure(s):  L4-L5 OBLIQUE LUMBAR INTERBODY FUSION, L4-S1 POSTERIOR DECOMPRESSION, L4-5 POSTERIOR LUMBAR FUSION WITH INSTRUMENTATION, ILIAC CREST BONE MARROW ASPIRATE AND OSTEOAMP FIBERS WITH IMAGE GUIDANCE    SUBJECTIVE:    Reports improvement in preop  Leg pain and numbness. Postop pain controlled  Well with flexeril and oxycodone. Reports some pain with left hip flexion and mild numbness in the left groin. Tolerating PO  Liquid intake well   Had BM after second suppository last night, and passing flatus since then. Walked [place] the [place] and did a flight of stairs yesterday. Denies nausea/vomiting, headache, chest pain or shortness of breath  Recent Labs     08/15/19  0610 19  0244   HGB 9.8* 10.1*   NA  --  136   K  --  4.1   CL  --  103   CO2  --  28   BUN  --  7   CREA  --  0.60   GLU  --  99     Patient Vitals for the past 12 hrs:   BP Temp Pulse Resp SpO2   19 0324 97/61 98.1 °F (36.7 °C) 65 16 94 %   08/15/19 2256 92/57 98.4 °F (36.9 °C) 62 16 92 %     Exam:  Positive strength/ROM bilat lower ext. Neuro intact to sensation  Dressings clean and dry   Abdomen-soft non tender   Calves- soft non tender  Lower extremities warm and well perfused      PLAN: 3 Days Post-Op, improved   Continue PO pain medications as needed  Continue SCD's, frequent ambulation   Advance to regular diet. Continue bowel regimen   Continue lumbar orthosis  Continue Vit D3  Medical comorbities stable   Discharge  Today. Discharge prescriptions in the chart.

## 2019-08-16 NOTE — DISCHARGE INSTRUCTIONS
Cruzito Garcia MD  [address] [facility]  Office Phone: [phone]  Lumbar Surgery Discharge Instructions    Activities   You are going home a well person, be as active as possible. Your only exercise should be walking. Start with short frequent walks and increase your walking distance each day. Start with walking three times per day for 5 minutes and increase your distance each day 2-3 minutes. Limit the amount of time you sit to 20-30 minute intervals. Sitting for prolonged periods of time will be uncomfortable for you following your surgery.  Do not lift anything over 8 pounds for 10-12 weeks, and do not do any bending or straining.  When you are in the bed, you may lay on your back or on either side. Do not lay on your stomach.  Continue using your incentive spirometer regularly for deep breathing exercises   You may resume sexual relations 6 weeks after your surgery      Diet   You may resume your normal diet. Be sure to drink plenty of fluids, it is important to keep yourself hydrated.  MAKE SURE YOU ARE GETTING GOOD NUTRITION (Lean Protein, Vitamin D AND Calcium)   Avoid alcoholic beverages and ABSOLUTELY NO tobacco products. Tobacco products will interfere with your healing. If you continue to use tobacco, you may end up needing another surgery in the future. Medications   Do not take anti-inflammatory medications or aspirin unless instructed by your physician.  Take your pain medication as directed.  Do NOT take additional Tylenol if your prescribed pain medication has acetaminophen in it (Endocet/Percocet, Lortab, Norco).  It is important to have regular bowel movements. Pain medications may cause constipation. Stool softeners, prune juice, and increasing your water and fiber intake may help in preventing constipation.  Do NOT take laxatives if at all possible except in severe situations. It can results in a vicious cycle of constipation and diarrhea.     Do not be alarmed if you still have some of the same symptoms you had prior to surgery. The nerves often require time to heal after the pressure has been relieved. You may experience pain in your shoulders or between your shoulder blades, which is common after this surgery. The level of pain you experience should improve as your body heals. Driving   You may not drive or return to work until instructed by your physician. However, you may ride in the car for short periods of time. Brace   If you have a back brace, you should wear your brace at all times when you are out of bed. Do not wear the brace while in bed or showering.  Remember to always wear a cotton t-shirt underneath your brace.  Do not bend or twist when your brace is off. Showering   For now, you may shower the front side of your body. Approximately 5-7 days after your surgery, if your incision is not draining, you may begin taking full showers. Your dressing is waterproof.  Do not rub or apply lotion or ointments to the incision site.  Do not use tub baths, swimming pools or Jacuzzis. Caring for your incision   Keep the Aquacel on until 7 days after discharge. At that point, if the incision is dry and without drainage, you may keep the wound open to air without cover.  You may have steri-strips on your incision (small, white pieces of tape). Do not pull the steri-strips; they will fall off on their own after several days. If you have sutures or staples, they will be removed by home health or when you see your physician.  Do not rub or apply any lotions or ointments to your incision site. Follow Up  · You should already have your follow-up visit scheduled with Dr. Brennan Vargas for 2-3 weeks after surgery. Please call the office if there are any questions or concerns regarding your follow-up.        Notify your physician if you develop any of the following conditions:   Fever above 101 degrees for 24 hours.   Nausea or vomiting.  Severe headache.  Inability to urinate.  Loss of bowel or bladder control (sudden onset of incontinence).  Changes in sensation in your extremities (numbness, tingling, loss of color).  Severe pain or pain not relieved by medications.  Redness, swelling, or drainage from your incision.  Persistent pain in the chest.    Pain in the calf of either leg.  Increased weakness (if this is greater than before your surgery). If you have any questions, contact your Orthopaedic Surgeons office.

## 2019-08-21 NOTE — DISCHARGE SUMMARY
Orthopedic Service Discharge Summary    Patient ID:  Lazarus Seal  [id number]  female  53 y.o.  1965    Admit date: 8/13/2019    Discharge date and time: 8/16/2019 11:04 AM     Admitting Physician: Pascual Garcia MD     Discharge Physician: Pascual Garcia MD    Consulting Physician(s): Treatment Team: Care Manager: Song Hall; Utilization Review: Radha Kaur RN; Primary Nurse: Cayetano Osler    Date of Surgery: 8/13/2019     Preoperative Diagnosis:  LUMBAR STENOSIS WITH NEUROGENIC CLAUDICATION  SPONDYLOLISTHESIS OF LUMBAR REGION  NEUROFORAMINAL STENOSIS OF LUMBAR SPINE  LOW BACK PAIN, UNSPECIFIED BACK PAIN LATERALITY, UNSPECIFIED CHRONICITY WITH SCIATICA PRESENCE UNSPECIFIED    Postoperative Diagnosis: LUMBAR STENOSIS WITH NEUROGENIC CLAUDICATION  SPONDYLOLISTHESIS OF LUMBAR REGION  NEUROFORAMINAL STENOSIS OF LUMBAR SPINE  LOW BACK PAIN, UNSPECIFIED BACK PAIN LATERALITY, UNSPECIFIED CHRONICITY WITH SCIATICA PRESENCE UNSPECIFIED    Procedure(s): Procedure(s):  L4-L5 OBLIQUE LUMBAR INTERBODY FUSION, L4-S1 POSTERIOR DECOMPRESSION, L4-5 POSTERIOR LUMBAR FUSION WITH INSTRUMENTATION, ILIAC CREST BONE MARROW ASPIRATE AND OSTEOAMP FIBERS WITH IMAGE GUIDANCE    Surgeon: Surgeon(s) and Role:     * Morgan Onofre MD - Primary     * Jazmin Bower MD - Assisting      Anesthesia:  General    Preoperative Medical Clearance:                           HPI:  Pt is a 53 y.o. female who has a history of Lumbar stenosis with neurogenic claudication [M48.062]  with pain and limitations of activities of daily living who presents at this time for a  Low back pain and bilateral  Leg pain and numbness in both feet and toes, following the failure of conservative management.     PMH:   Past Medical History:   Diagnosis Date    Anxiety     Atypical lobular hyperplasia of breast 2011    LEFT breast     Degenerative lumbar spinal stenosis     History of seasonal allergies     PONV (postoperative nausea and vomiting)     Pure hypercholesterolemia 3/18/2011    PVCs (premature ventricular contractions)     Has resolved       Medications upon admission :   Prior to Admission Medications   Prescriptions Last Dose Informant Patient Reported? Taking? MULTIVITAMIN PO 2019  Yes No   Sig: Take 1 Tab by mouth daily. cetirizine (ZYRTEC) 10 mg tablet 2019 at 2100  Yes Yes   Sig: Take 10 mg by mouth every morning. cholecalciferol, VITAMIN D3, (VITAMIN D3) 5,000 unit tab tablet   No No   Sig: Take 1 Tab by mouth daily for 30 days. Please take starting today until finished per Dr. Robyn Cervantes protocol   escitalopram oxalate (LEXAPRO) 20 mg tablet 2019 at 0530  Yes Yes   Sig: Take 20 mg by mouth every morning. estradiol (CLIMARA) 0.075 mg/24 hr ptwk 2019  Yes No   Si Patch by TransDERmal route two (2) times a week. phenylephrine hcl (SUDAFED PE) 10 mg tab 2019 at 2100  Yes Yes   Sig: Take 1 Tab by mouth as needed. rosuvastatin (CRESTOR) 20 mg tablet 2019 at 2100  Yes Yes   Sig: Take 20 mg by mouth nightly. Facility-Administered Medications: None        Allergies: Allergies   Allergen Reactions    Metoprolol Other (comments)     Dropped HR too low    Pcn [Penicillins] Hives and Rash    Zithromax [Azithromycin] Other (comments)     Stomach cramps        Hospital Course: The patient underwent surgery. Complications:  None; patient tolerated the procedure well. Was taken to the PACU in stable condition and then transferred to the Orthopedics floor. Perioperative Antibiotics:  Ancef     Postoperative Pain Management:  Acetaminophen and Oxycodone ,  DVT Prophylaxis: SCD    Postoperative transfusions:     none Banked PRBCs     Post Op complications: none    Hemoglobin at discharge:    Lab Results   Component Value Date/Time    HGB 9.8 (L) 08/15/2019 06:10 AM    INR 1.0 2019 10:36 AM       POD # 1. Incision - clean, dry and intact. No significant erythema or swelling.  Pre op bilateral leg symptoms improved. Neurovascular exam within normal limits. Wound appears to be healing without any evidence of infection. Patient had a slow return of bowel functions, kept on liquid diet. Physical Therapy started on the day following surgery and progressed to ambulation with the aid of a rolling walker for distances of 50 feet with minimal assistance. POD#2  Incision Clean, dry and intact. Pre op bilateral leg pain and numbness in th feet and toes improved. Post op pain was controlled adequately. Patient did not pass flatus until the 2nd night, after suppository x2. Pt had a HVAC drain the was removed on day 2. Ambulated with PT  Using walker  With minimal assistance. POD #3  Incision clean and dry. Pre op symptoms remained resolved. Neuro vascular exams  WNL. Post op pain was well controlled. Ambulated  With PT and was able to up and down stairs. .  At the time of discharge,  Pre op symptoms remained resolved, post op pain was well controlled  and had understanding of precautions needed following surgery. Discharged to: Home. Discharge instructions:  -See Full Summary of discharge instructions attached  -Anticoagulate with none  -Resume pre hospital diet            -Resume home medications   Discharge Medication List as of 8/16/2019 10:13 AM      START taking these medications    Details   acetaminophen (TYLENOL) 500 mg tablet Take 2 Tabs by mouth every six (6) hours. , Print, Disp-60 Tab, R-1      cyclobenzaprine (FLEXERIL) 10 mg tablet Take 1 Tab by mouth two (2) times daily as needed for Muscle Spasm(s). , Print, Disp-60 Tab, R-0      oxyCODONE IR (ROXICODONE) 5 mg immediate release tablet Take 1-2 Tabs by mouth every six (6) hours as needed for Pain for up to 7 days. Max Daily Amount: 40 mg., Print, Disp-56 Tab, R-0      calcium citrate 250 mg calcium tab tablet Take 2 Tabs by mouth daily. , Print, Disp-60 Tab, R-1         CONTINUE these medications which have CHANGED    Details cholecalciferol, VITAMIN D3, (VITAMIN D3) 5,000 unit tab tablet Take 1 Tab by mouth daily for 30 days. , Print, Disp-60 Tab, R-1         CONTINUE these medications which have NOT CHANGED    Details   rosuvastatin (CRESTOR) 20 mg tablet Take 20 mg by mouth nightly., Historical Med      cetirizine (ZYRTEC) 10 mg tablet Take 10 mg by mouth every morning., Historical Med      MULTIVITAMIN PO Take 1 Tab by mouth daily. , Historical Med      estradiol (CLIMARA) 0.075 mg/24 hr ptwk 1 Patch by TransDERmal route two (2) times a week., Historical Med      phenylephrine hcl (SUDAFED PE) 10 mg tab Take 1 Tab by mouth as needed., Historical Med      escitalopram oxalate (LEXAPRO) 20 mg tablet Take 20 mg by mouth every morning., Historical Med          per medical continuation form  -Discharge activity: Activity as tolerated, Ambulate in house and No driving while on analgesics    -Wound Care Keep wound clean and dry, Reinforce dressing PRN, Ice to area for comfort and As directed  -Follow up in office in 2 weeks      Signed:  Fran Garcia NP  8/21/2019  12:06 PM        No att. providers found

## 2020-01-20 ENCOUNTER — HOSPITAL ENCOUNTER (OUTPATIENT)
Dept: MAMMOGRAPHY | Age: 55
Discharge: HOME | End: 2020-01-20
Attending: SURGERY
Payer: COMMERCIAL

## 2020-01-20 DIAGNOSIS — Z12.31 ENCOUNTER FOR SCREENING MAMMOGRAM FOR MALIGNANT NEOPLASM OF BREAST: ICD-10-CM

## 2020-01-20 PROCEDURE — 77067 SCR MAMMO BI INCL CAD: CPT

## 2020-08-30 ENCOUNTER — APPOINTMENT (OUTPATIENT)
Dept: GENERAL RADIOLOGY | Age: 55
End: 2020-08-30
Attending: EMERGENCY MEDICINE
Payer: COMMERCIAL

## 2020-08-30 ENCOUNTER — HOSPITAL ENCOUNTER (EMERGENCY)
Age: 55
Discharge: HOME | End: 2020-08-30
Attending: EMERGENCY MEDICINE | Admitting: EMERGENCY MEDICINE
Payer: COMMERCIAL

## 2020-08-30 VITALS
WEIGHT: 145.72 LBS | TEMPERATURE: 97.9 F | RESPIRATION RATE: 16 BRPM | OXYGEN SATURATION: 96 % | HEIGHT: 66 IN | BODY MASS INDEX: 23.42 KG/M2 | SYSTOLIC BLOOD PRESSURE: 123 MMHG | DIASTOLIC BLOOD PRESSURE: 71 MMHG | HEART RATE: 66 BPM

## 2020-08-30 DIAGNOSIS — S61.211A LACERATION OF LEFT INDEX FINGER WITHOUT FOREIGN BODY WITHOUT DAMAGE TO NAIL, INITIAL ENCOUNTER: Primary | ICD-10-CM

## 2020-08-30 PROCEDURE — 74011250637 HC RX REV CODE- 250/637: Performed by: EMERGENCY MEDICINE

## 2020-08-30 PROCEDURE — 73140 X-RAY EXAM OF FINGER(S): CPT

## 2020-08-30 PROCEDURE — 75810000293 HC SIMP/SUPERF WND  RPR

## 2020-08-30 PROCEDURE — 99283 EMERGENCY DEPT VISIT LOW MDM: CPT

## 2020-08-30 RX ORDER — IBUPROFEN 600 MG/1
600 TABLET ORAL
Status: COMPLETED | OUTPATIENT
Start: 2020-08-30 | End: 2020-08-30

## 2020-08-30 RX ADMIN — IBUPROFEN 600 MG: 600 TABLET, FILM COATED ORAL at 14:04

## 2020-08-30 NOTE — ED NOTES
The patient was discharged home by provider in stable condition. The patient is alert and oriented, in no respiratory distress and discharge vital signs obtained. The patient's diagnosis, condition and treatment were explained. The patient expressed understanding. A discharge plan has been developed. A  was not involved in the process. Aftercare instructions were given. Pt ambulatory out of the ED.

## 2020-08-30 NOTE — ED PROVIDER NOTES
Please note that this dictation was completed with [employer], the computer voice recognition software.  Quite often unanticipated grammatical, syntax, homophones, and other interpretive errors are inadvertently transcribed by the computer software.  Please disregard these errors.  Please excuse any errors that have escaped final proofreading. 58-year-old female past medical history remarkable for anxiety, atypical lobar hyperplasia of the left breast, degenerative lumbar spinal stenosis, seasonal allergies, postop nausea vomiting, high cholesterol, and PVCs presents the ER complaining of \"was using hedge tremors was not sure where to put my left hand my hand got too close to the blade to cut my left index finger. This occurred just prior to arrival patient states her last tetanus shot was within 5 years she is right-hand dominant.                Past Medical History:   Diagnosis Date    Anxiety     Atypical lobular hyperplasia of breast 2011    LEFT breast     Degenerative lumbar spinal stenosis     History of seasonal allergies     PONV (postoperative nausea and vomiting)     Pure hypercholesterolemia 3/18/2011    PVCs (premature ventricular contractions) 2009    Has resolved       Past Surgical History:   Procedure Laterality Date    HX BREAST BIOPSY Right 1996    RIGHT-Benign    HX BREAST BIOPSY Left 7/2011    LEFT-ADH    HX COLONOSCOPY N/A     HX GYN      reanastomosis of tubes    HX GYN  7/2012    Uterine ablation    HX HYSTERECTOMY N/A     HX ROTATOR CUFF REPAIR Right 2010         Family History:   Problem Relation Age of Onset    Diabetes Mother     Heart Disease Mother     Heart Disease Father     Lung Cancer Father     Heart Surgery Father     Cancer Maternal Grandmother         breast    Breast Cancer Maternal Grandmother 36        age 42's   [facility] Arrhythmia Sister         PAT    Diabetes Brother        Social History     Socioeconomic History    Marital status:      Spouse name: Not on file    Number of children: Not on file    Years of education: Not on file    Highest education level: Not on file   Occupational History    Not on file   Social Needs    Financial resource strain: Not on file    Food insecurity     Worry: Not on file     Inability: Not on file    Transportation needs     Medical: Not on file     Non-medical: Not on file   Tobacco Use    Smoking status: Never Smoker    Smokeless tobacco: Never Used   Substance and Sexual Activity    Alcohol use: Yes     Alcohol/week: 7.0 standard drinks     Types: 7 Glasses of wine per week    Drug use: No    Sexual activity: Not on file   Lifestyle    Physical activity     Days per week: Not on file     Minutes per session: Not on file    Stress: Not on file   Relationships    Social connections     Talks on phone: Not on file     Gets together: Not on file     Attends [employer] service: Not on file     Active member of club or organization: Not on file     Attends meetings of clubs or organizations: Not on file     Relationship status: Not on file    Intimate partner violence     Fear of current or ex partner: Not on file     Emotionally abused: Not on file     Physically abused: Not on file     Forced sexual activity: Not on file   Other Topics Concern     Service Not Asked    Blood Transfusions Not Asked    Caffeine Concern Not Asked    Occupational Exposure Not Asked   [place] Pleas Hazards Not Asked    Sleep Concern Not Asked    Stress Concern Not Asked    Weight Concern Not Asked    Special Diet Not Asked    Back Care Not Asked    Exercise Not Asked    Bike Helmet Not Asked   [address] Not Asked    Self-Exams Not Asked   Social History Narrative    Not on file         ALLERGIES: Metoprolol; Pcn [penicillins]; and Zithromax [azithromycin]    Review of Systems   Skin: Positive for wound. All other systems reviewed and are negative.       Vitals:    08/30/20 1332   BP: 141/74   Pulse: 75   Resp: 16 Temp: 97.9 °F (36.6 °C)   SpO2: 97%   Weight: 66.1 kg (145 lb 11.6 oz)   Height: 5' 6\" (1.676 m)            Physical Exam  Vitals signs and nursing note reviewed. Constitutional:       General: She is not in acute distress. Appearance: Normal appearance. She is well-developed. She is not ill-appearing, toxic-appearing or diaphoretic. Comments: NAD, AxOx4, speaking in complete sentences    gcs = 15   HENT:      Head: Normocephalic and atraumatic. Right Ear: External ear normal.      Left Ear: External ear normal.   Eyes:      General: No scleral icterus. Right eye: No discharge. Conjunctiva/sclera: Conjunctivae normal.      Pupils: Pupils are equal, round, and reactive to light. Neck:      Musculoskeletal: Normal range of motion and neck supple. Vascular: No JVD. Trachea: No tracheal deviation. Cardiovascular:      Rate and Rhythm: Normal rate and regular rhythm. Pulses: Normal pulses. Heart sounds: Normal heart sounds. No murmur. No friction rub. No gallop. Pulmonary:      Effort: Pulmonary effort is normal. No respiratory distress. Breath sounds: Normal breath sounds. No wheezing or rales. Chest:      Chest wall: No tenderness. Abdominal:      General: Bowel sounds are normal.      Palpations: Abdomen is soft. Tenderness: There is no abdominal tenderness. There is no guarding or rebound. Genitourinary:     Vagina: No vaginal discharge. Musculoskeletal: Normal range of motion. General: Signs of injury present. No swelling, tenderness or deformity. Right lower leg: No edema. Left lower leg: No edema. Comments: L index finger - noted small laceration lateral aspect, maybe 1.2 cm in length;  nail intact/ pt has distal motor/ CV/ Sensation grossly intact to tip;    Skin:     General: Skin is warm and dry. Capillary Refill: Capillary refill takes less than 2 seconds. Coloration: Skin is not jaundiced or pale. Findings: No bruising, erythema, lesion or rash. Neurological:      General: No focal deficit present. Mental Status: She is alert and oriented to person, place, and time. Cranial Nerves: No cranial nerve deficit. Motor: No abnormal muscle tone. Coordination: Coordination normal.   Psychiatric:         Behavior: Behavior normal.         Thought Content: Thought content normal.          MDM       Wound Closure by Adhesive    Date/Time: 8/30/2020 3:11 PM  Performed by: Venkat Conner MD  Authorized by: Venkat Conner MD     Consent:     Consent obtained:  Verbal    Consent given by:  Patient    Risks discussed:  Infection, need for additional repair, nerve damage, poor wound healing, poor cosmetic result, pain, retained foreign body, tendon damage and vascular damage    Alternatives discussed:  No treatment  Anesthesia (see MAR for exact dosages): Anesthesia method:  None  Laceration details:     Location:  Finger    Finger location:  L index finger    Length (cm):  1    Depth (mm):  1  Repair type:     Repair type:  Simple  Pre-procedure details:     Preparation:  Patient was prepped and draped in usual sterile fashion and imaging obtained to evaluate for foreign bodies  Exploration:     Hemostasis achieved with:  Cautery    Wound extent: no areolar tissue violation noted, no fascia violation noted, no foreign bodies/material noted, no muscle damage noted, no nerve damage noted, no tendon damage noted, no underlying fracture noted and no vascular damage noted      Contaminated: no    Treatment:     Area cleansed with:  Shur-Clens    Amount of cleaning:  Standard  Skin repair:     Repair method:  Tissue adhesive  Approximation:     Approximation:  Close  Post-procedure details:     Dressing:  Open (no dressing)  Comments:      Told it would scar          3:13 PM  Nirav Langley's  results have been reviewed with her. She has been counseled regarding her diagnosis.   She verbally conveys understanding and agreement of the signs, symptoms, diagnosis, treatment and prognosis and additionally agrees to Call/ Arrange follow up as recommended with Dr. Maikol Bose MD in 24 - 48 hours. She also agrees with the care-plan and conveys that all of her questions have been answered. I have also put together some discharge instructions for her that include: 1) educational information regarding their diagnosis, 2) how to care for their diagnosis at home, as well a 3) list of reasons why they would want to return to the ED prior to their follow-up appointment, should their condition change or for concerns.

## 2020-08-30 NOTE — DISCHARGE INSTRUCTIONS
Patient Education        Cuts Closed With Adhesives: Care Instructions  Your Care Instructions  A cut can happen anywhere on your body. The doctor used an adhesive to close the cut. When the adhesive dries, it forms a film that holds the edges of the cut together. Skin adhesives are sometimes called liquid stitches. If the cut went deep and through the skin, the doctor may have put in a layer of stitches below the adhesive. The deeper layer of stitches brings the deep part of the cut together. These stitches will dissolve and don't need to be removed. You don't see the stitches, only the adhesive. You may have a bandage. The doctor has checked you carefully, but problems can develop later. If you notice any problems or new symptoms, get medical treatment right away. Follow-up care is a key part of your treatment and safety. Be sure to make and go to all appointments, and call your doctor if you are having problems. It's also a good idea to know your test results and keep a list of the medicines you take. How can you care for yourself at home? · Keep the cut dry for the first 24 to 48 hours. After this, you can shower if your doctor okays it. Pat the cut dry. · Don't soak the cut, such as in a bathtub. Your doctor will tell you when it's safe to get the cut wet. · If your doctor told you how to care for your cut, follow your doctor's instructions. If you did not get instructions, follow this general advice:  ? Do not put any kind of ointment, cream, or lotion over the area. This can make the adhesive fall off too soon. ? After the first 24 to 48 hours, wash around the cut with clean water 2 times a day. Do not use hydrogen peroxide or alcohol, which can slow healing. ? If the doctor told you to use a bandage, put on a new bandage after cleaning the cut or if the bandage gets wet or dirty. · Prop up the sore area on a pillow anytime you sit or lie down during the next 3 days.  Try to keep it above the level of your heart. This will help reduce swelling. · Leave the skin adhesive on your skin until it falls off on its own. This may take 5 to 10 days. · Do not scratch, rub, or pick at the adhesive. · Do not put the sticky part of a bandage directly on the adhesive. · Avoid any activity that could cause your cut to reopen. · Be safe with medicines. Read and follow all instructions on the label. ? If the doctor gave you a prescription medicine for pain, take it as prescribed. ? If you are not taking a prescription pain medicine, ask your doctor if you can take an over-the-counter medicine. When should you call for help? Call your doctor now or seek immediate medical care if:  · You have new pain, or your pain gets worse. · The skin near the cut is cold or pale or changes color. · You have tingling, weakness, or numbness near the cut. · The cut starts to bleed. · You have trouble moving the area near the cut. · You have symptoms of infection, such as:  ? Increased pain, swelling, warmth, or redness around the cut.  ? Red streaks leading from the cut.  ? Pus draining from the cut.  ? A fever. Watch closely for changes in your health, and be sure to contact your doctor if:  · The cut reopens. · You do not get better as expected. Where can you learn more? Go to http://www.gray.com/  Enter P174 in the search box to learn more about \"Cuts Closed With Adhesives: Care Instructions. \"  Current as of: June 26, 2019               Content Version: 12.5  © 2744-5755 [employer]. Care instructions adapted under license by [employer] (which disclaims liability or warranty for this information). If you have questions about a medical condition or this instruction, always ask your healthcare professional. [address] any warranty or liability for your use of this information.

## 2020-09-17 LAB — SARS-COV-2, NAA: NOT DETECTED

## 2021-03-01 ENCOUNTER — TRANSCRIBE ORDER (OUTPATIENT)
Dept: SCHEDULING | Age: 56
End: 2021-03-01

## 2021-03-01 DIAGNOSIS — Z12.31 VISIT FOR SCREENING MAMMOGRAM: Primary | ICD-10-CM

## 2021-04-01 ENCOUNTER — HOSPITAL ENCOUNTER (OUTPATIENT)
Dept: MAMMOGRAPHY | Age: 56
Discharge: HOME | End: 2021-04-01
Attending: SURGERY
Payer: COMMERCIAL

## 2021-04-01 DIAGNOSIS — Z12.31 VISIT FOR SCREENING MAMMOGRAM: ICD-10-CM

## 2021-04-01 DIAGNOSIS — N63.10 BREAST MASS, RIGHT: Primary | ICD-10-CM

## 2021-04-01 DIAGNOSIS — N63.10 BREAST MASS, RIGHT: ICD-10-CM

## 2021-04-01 PROCEDURE — 77062 BREAST TOMOSYNTHESIS BI: CPT

## 2021-04-01 PROCEDURE — 76642 ULTRASOUND BREAST LIMITED: CPT

## 2021-04-01 NOTE — PROGRESS NOTES
Placed orders for 3d jer dx rashid and right breast us per [place] [facility] patient is here now for [facility] and feels a lump in her right breast.

## 2021-08-04 ENCOUNTER — HOSPITAL ENCOUNTER (EMERGENCY)
Age: 56
Discharge: HOME | End: 2021-08-04
Attending: EMERGENCY MEDICINE
Payer: COMMERCIAL

## 2021-08-04 ENCOUNTER — APPOINTMENT (OUTPATIENT)
Dept: GENERAL RADIOLOGY | Age: 56
End: 2021-08-04
Attending: EMERGENCY MEDICINE
Payer: COMMERCIAL

## 2021-08-04 VITALS
HEART RATE: 67 BPM | SYSTOLIC BLOOD PRESSURE: 118 MMHG | RESPIRATION RATE: 16 BRPM | DIASTOLIC BLOOD PRESSURE: 87 MMHG | TEMPERATURE: 97.6 F | HEIGHT: 66 IN | BODY MASS INDEX: 22.11 KG/M2 | WEIGHT: 137.57 LBS | OXYGEN SATURATION: 100 %

## 2021-08-04 DIAGNOSIS — R07.9 CHEST PAIN, UNSPECIFIED TYPE: Primary | ICD-10-CM

## 2021-08-04 LAB
ALBUMIN SERPL-MCNC: 3.9 G/DL (ref 3.5–5)
ALBUMIN/GLOB SERPL: 1.1 {RATIO} (ref 1.1–2.2)
ALP SERPL-CCNC: 55 U/L (ref 45–117)
ALT SERPL-CCNC: 34 U/L (ref 12–78)
ANION GAP SERPL CALC-SCNC: 2 MMOL/L (ref 5–15)
AST SERPL-CCNC: 14 U/L (ref 15–37)
BASOPHILS # BLD: 0.1 K/UL (ref 0–0.1)
BASOPHILS NFR BLD: 1 % (ref 0–1)
BILIRUB SERPL-MCNC: 0.4 MG/DL (ref 0.2–1)
BUN SERPL-MCNC: 14 MG/DL (ref 6–20)
BUN/CREAT SERPL: 21 (ref 12–20)
CALCIUM SERPL-MCNC: 8.6 MG/DL (ref 8.5–10.1)
CHLORIDE SERPL-SCNC: 98 MMOL/L (ref 97–108)
CO2 SERPL-SCNC: 33 MMOL/L (ref 21–32)
COMMENT, HOLDF: NORMAL
CREAT SERPL-MCNC: 0.66 MG/DL (ref 0.55–1.02)
DIFFERENTIAL METHOD BLD: ABNORMAL
EOSINOPHIL # BLD: 0.1 K/UL (ref 0–0.4)
EOSINOPHIL NFR BLD: 1 % (ref 0–7)
ERYTHROCYTE [DISTWIDTH] IN BLOOD BY AUTOMATED COUNT: 12.8 % (ref 11.5–14.5)
GLOBULIN SER CALC-MCNC: 3.5 G/DL (ref 2–4)
GLUCOSE SERPL-MCNC: 92 MG/DL (ref 65–100)
HCT VFR BLD AUTO: 41.3 % (ref 35–47)
HGB BLD-MCNC: 14 G/DL (ref 11.5–16)
IMM GRANULOCYTES # BLD AUTO: 0 K/UL (ref 0–0.04)
IMM GRANULOCYTES NFR BLD AUTO: 1 % (ref 0–0.5)
LYMPHOCYTES # BLD: 2.1 K/UL (ref 0.8–3.5)
LYMPHOCYTES NFR BLD: 26 % (ref 12–49)
MCH RBC QN AUTO: 32.3 PG (ref 26–34)
MCHC RBC AUTO-ENTMCNC: 33.9 G/DL (ref 30–36.5)
MCV RBC AUTO: 95.4 FL (ref 80–99)
MONOCYTES # BLD: 0.6 K/UL (ref 0–1)
MONOCYTES NFR BLD: 8 % (ref 5–13)
NEUTS SEG # BLD: 5.4 K/UL (ref 1.8–8)
NEUTS SEG NFR BLD: 63 % (ref 32–75)
NRBC # BLD: 0 K/UL (ref 0–0.01)
NRBC BLD-RTO: 0 PER 100 WBC
PLATELET # BLD AUTO: 320 K/UL (ref 150–400)
PMV BLD AUTO: 8.9 FL (ref 8.9–12.9)
POTASSIUM SERPL-SCNC: 4.1 MMOL/L (ref 3.5–5.1)
PROT SERPL-MCNC: 7.4 G/DL (ref 6.4–8.2)
RBC # BLD AUTO: 4.33 M/UL (ref 3.8–5.2)
SAMPLES BEING HELD,HOLD: NORMAL
SODIUM SERPL-SCNC: 133 MMOL/L (ref 136–145)
TROPONIN I BLD-MCNC: <0.04 NG/ML (ref 0–0.08)
TROPONIN I SERPL-MCNC: <0.05 NG/ML
WBC # BLD AUTO: 8.3 K/UL (ref 3.6–11)

## 2021-08-04 PROCEDURE — 36415 COLL VENOUS BLD VENIPUNCTURE: CPT

## 2021-08-04 PROCEDURE — 84484 ASSAY OF TROPONIN QUANT: CPT

## 2021-08-04 PROCEDURE — 80053 COMPREHEN METABOLIC PANEL: CPT

## 2021-08-04 PROCEDURE — 99284 EMERGENCY DEPT VISIT MOD MDM: CPT

## 2021-08-04 PROCEDURE — 93005 ELECTROCARDIOGRAM TRACING: CPT

## 2021-08-04 PROCEDURE — 85025 COMPLETE CBC W/AUTO DIFF WBC: CPT

## 2021-08-04 PROCEDURE — 71046 X-RAY EXAM CHEST 2 VIEWS: CPT

## 2021-08-04 NOTE — ED NOTES
5:28 PM  I have just evaluated the patient. I have reviewed Her vital signs and determined there is currently no worsening in their condition or physical exam. I have talked with the patient and the family and advised them that I am the provider in triage and have ordered lab work, x rays and other diagnostic tests. I have advised them that we will try and get them to the back as soon as possible. I have also advised them that should they have a worsening condition or any problems before they are sent back to the main ED, to contact me or the triage nurse. CP on/off for weeks. Constant for last 12 hours with some associated shoulder, neck, left arm pain.     Luiza Hernandez, DO

## 2021-08-04 NOTE — ED TRIAGE NOTES
Pt arrives with c/o acute onset chest pain starting around 1400 that radiates to left arm, neck, jaw. Also c/o pain behind right eye.

## 2021-08-05 LAB
ATRIAL RATE: 79 BPM
CALCULATED P AXIS, ECG09: 64 DEGREES
CALCULATED R AXIS, ECG10: 53 DEGREES
CALCULATED T AXIS, ECG11: 31 DEGREES
DIAGNOSIS, 93000: NORMAL
P-R INTERVAL, ECG05: 146 MS
Q-T INTERVAL, ECG07: 382 MS
QRS DURATION, ECG06: 80 MS
QTC CALCULATION (BEZET), ECG08: 438 MS
VENTRICULAR RATE, ECG03: 79 BPM

## 2021-08-05 NOTE — ED PROVIDER NOTES
Pt is a 60 yo female with hx of anxiety, spinal stenosis, seasonal allergies, HLD who presents with 1 week hx of intermittent left sided chest pain. Today episode occurred with radiation up to left jaw and down left arm. Pt currently with no symptoms. Follows cardiology Dr Tara Joseph at [facility]. Remote hx of normal stress test. Had a cardiac CTA recently that showed coronary plaque. Past Medical History:   Diagnosis Date    Anxiety     Atypical lobular hyperplasia of breast 2011    LEFT breast     Degenerative lumbar spinal stenosis     History of seasonal allergies     PONV (postoperative nausea and vomiting)     Pure hypercholesterolemia 3/18/2011    PVCs (premature ventricular contractions) 2009    Has resolved       Past Surgical History:   Procedure Laterality Date    HX BREAST BIOPSY Right 1996    RIGHT-Benign    HX BREAST BIOPSY Left 7/2011    LEFT-ADH    HX COLONOSCOPY N/A     HX GYN      reanastomosis of tubes    HX GYN  7/2012    Uterine ablation    HX HYSTERECTOMY N/A     HX ROTATOR CUFF REPAIR Right 2010         Family History:   Problem Relation Age of Onset    Diabetes Mother     Heart Disease Mother     Heart Disease Father     Lung Cancer Father     Heart Surgery Father     Cancer Maternal Grandmother         breast    Breast Cancer Maternal Grandmother 36        age 42's   Genevia Nares Arrhythmia Sister         PAT    Diabetes Brother        Social History     Socioeconomic History    Marital status:      Spouse name: Not on file    Number of children: Not on file    Years of education: Not on file    Highest education level: Not on file   Occupational History    Not on file   Tobacco Use    Smoking status: Never Smoker    Smokeless tobacco: Never Used   Substance and Sexual Activity    Alcohol use:  Yes     Alcohol/week: 7.0 standard drinks     Types: 7 Glasses of wine per week    Drug use: No    Sexual activity: Not on file   Other Topics Concern     Service Not Asked    Blood Transfusions Not Asked    Caffeine Concern Not Asked    Occupational Exposure Not Asked    Hobby Hazards Not Asked    Sleep Concern Not Asked    Stress Concern Not Asked    Weight Concern Not Asked    Special Diet Not Asked    Back Care Not Asked    Exercise Not Asked    Bike Helmet Not Asked   [address] Not Asked    Self-Exams Not Asked   Social History Narrative    Not on file     Social Determinants of Health     Financial Resource Strain:     Difficulty of Paying Living Expenses:    Food Insecurity:     Worried About Running Out of Food in the Last Year:     [address] in the Last Year:    Transportation Needs:     Lack of Transportation (Medical):  Lack of Transportation (Non-Medical):    Physical Activity:     Days of Exercise per Week:     Minutes of Exercise per Session:    Stress:     Feeling of Stress :    Social Connections:     Frequency of Communication with Friends and Family:     Frequency of Social Gatherings with Friends and Family:     Attends Orthodoxy Services:     Active Member of Clubs or Organizations:     Attends Club or Organization Meetings:     Marital Status:    Intimate Partner Violence:     Fear of Current or Ex-Partner:     Emotionally Abused:     Physically Abused:     Sexually Abused: ALLERGIES: Metoprolol, Pcn [penicillins], and Zithromax [azithromycin]    Review of Systems   Constitutional: Negative for chills and fever. HENT: Negative for drooling and nosebleeds. Eyes: Negative for pain and itching. Respiratory: Negative for choking and stridor. Cardiovascular: Positive for chest pain. Negative for leg swelling. Gastrointestinal: Negative for abdominal distention and rectal pain. Endocrine: Negative for heat intolerance and polyphagia. Genitourinary: Negative for enuresis and genital sores. Musculoskeletal: Negative for arthralgias and joint swelling. Skin: Negative for color change. Allergic/Immunologic: Negative for immunocompromised state. Neurological: Negative for tremors and speech difficulty. Hematological: Negative for adenopathy. Psychiatric/Behavioral: Negative for dysphoric mood and sleep disturbance. Vitals:    08/04/21 1719 08/04/21 1725 08/04/21 2205   BP: (!) 168/98  118/87   Pulse: 76  67   Resp: 16  16   Temp: 98.7 °F (37.1 °C)  97.6 °F (36.4 °C)   SpO2: 99% 99% 100%   Weight: 62.4 kg (137 lb 9.1 oz)     Height: 5' 6\" (1.676 m)              Physical Exam  Vitals and nursing note reviewed. Constitutional:       General: She is not in acute distress. Appearance: She is well-developed. She is not ill-appearing, toxic-appearing or diaphoretic. HENT:      Head: Normocephalic and atraumatic. Nose: Nose normal.   Eyes:      Conjunctiva/sclera: Conjunctivae normal.   Cardiovascular:      Rate and Rhythm: Normal rate and regular rhythm. Heart sounds: Normal heart sounds. Pulmonary:      Effort: Pulmonary effort is normal. No respiratory distress. Breath sounds: Normal breath sounds. Abdominal:      General: There is no distension. Palpations: Abdomen is soft. Tenderness: There is no abdominal tenderness. Musculoskeletal:         General: No deformity. Normal range of motion. Cervical back: Normal range of motion and neck supple. Skin:     General: Skin is warm and dry. Neurological:      Mental Status: She is alert and oriented to person, place, and time. Coordination: Coordination normal.   Psychiatric:         Behavior: Behavior normal.          MDM  Number of Diagnoses or Management Options  Chest pain, unspecified type  Diagnosis management comments: Pt with chest pain, reporting hx of CAD. Will call her cardiologist in the AM and follow up. Stable for dc. ED Course as of Aug 04 2239   Wed Aug 04, 2021   1729 EKG 1714 sinus at 79 with normal axis and normal intervals.   No STEMI      [GG]   2213 ED EKG interpretation:  Rhythm: normal sinus rhythm; and regular . Rate (approx.): 79; Axis: normal; ST/T wave: normal; No evidence of acute coronary ischemia. [AL]      ED Course User Index  [AL] Darryn Villatoro MD  [GG] Jean Pierre Hernandez DO       Procedures    Patient's results have been reviewed with them. Patient and/or family have verbally conveyed their understanding and agreement of the patient's signs, symptoms, diagnosis, treatment and prognosis and additionally agree to follow up as recommended or return to the Emergency Room should their condition change prior to follow-up. Discharge instructions have also been provided to the patient with some educational information regarding their diagnosis as well a list of reasons why they would want to return to the ER prior to their follow-up appointment should their condition change.

## 2021-08-05 NOTE — DISCHARGE INSTRUCTIONS
Your EKG and blood tests showed you did not have a heart attack. Please call your cardiologist tomorrow for further work up. Thank you.

## 2021-09-08 ENCOUNTER — OFFICE VISIT (OUTPATIENT)
Dept: NEUROLOGY | Age: 56
End: 2021-09-08
Payer: COMMERCIAL

## 2021-09-08 VITALS
DIASTOLIC BLOOD PRESSURE: 82 MMHG | HEART RATE: 75 BPM | OXYGEN SATURATION: 100 % | TEMPERATURE: 98.1 F | BODY MASS INDEX: 21.63 KG/M2 | SYSTOLIC BLOOD PRESSURE: 130 MMHG | WEIGHT: 134 LBS

## 2021-09-08 DIAGNOSIS — R20.2 PARESTHESIA: ICD-10-CM

## 2021-09-08 DIAGNOSIS — G89.29 CHRONIC INTRACTABLE HEADACHE, UNSPECIFIED HEADACHE TYPE: Primary | ICD-10-CM

## 2021-09-08 DIAGNOSIS — R51.9 CHRONIC INTRACTABLE HEADACHE, UNSPECIFIED HEADACHE TYPE: Primary | ICD-10-CM

## 2021-09-08 PROCEDURE — 99244 OFF/OP CNSLTJ NEW/EST MOD 40: CPT | Performed by: PSYCHIATRY & NEUROLOGY

## 2021-09-08 RX ORDER — GABAPENTIN 300 MG/1
300 CAPSULE ORAL 2 TIMES DAILY
COMMUNITY

## 2021-09-08 RX ORDER — CHOLECALCIFEROL TAB 125 MCG (5000 UNIT) 125 MCG
5000 TAB ORAL DAILY
COMMUNITY

## 2021-09-08 NOTE — LETTER
9/8/2021    Patient: Cheyanne Templeton   YOB: 1965   Date of Visit: 9/8/2021     [address]HEL[address]/ [phone]  Via Fax: [phone]    Dear Clay Waller MD,      Thank you for referring Ms. Nelson Boles to [facility] for evaluation. My notes for this consultation are attached. If you have questions, please do not hesitate to call me. I look forward to following your patient along with you.       Sincerely,    Juan Drew MD

## 2021-09-08 NOTE — PROGRESS NOTES
NEUROLOGY NEW PATIENT OFFICE CONSULTATION      9/8/2021    RE: Kolton Ortiz         1965      REFERRED BY:  Lv Parsons MD        CHIEF COMPLAINT:  This is Kolton Ortiz is a 61 y.o. female  who had concerns including Visual Field Change (Pain in and behind right eye, causing blurred vision, balance issues, neuropathy in legs, weakness in extremities). HPI:     Since 2016, patient noted pressure behind her eyes, R worse than L. (+) headache    Episodes of random patchy tingling pin prick sensations, L cheek and L forearm and both feet. Benton Armstrong and had L4L5 laminectomy Aug 2019 with improvement of mobility of lower back pain. Recently had severe R periorbital pain and developed L sided weakness. Tried Imitrex and Prednisone.    (+) memory issues. Saw a rheumatologist Dr Wilson Saez who said everything was okay and who thought she might have Fibromyalgia and placed on Gabapentin. Previous tests done:  CT head was okay as per patient  MRI cervical spine (9/7/21): C5C6 and C6C7 disc spurs R worse than L  MRI lumbar spine (1/7/21): Excell Jaylene Bilateral laminectomy with pedicle screw fixation L4/L5. The central canal and neural foramina are widely patent at this level. 2. Moderate right-sided neural foraminal narrowing L5/S1. This is   unchanged from prior examination. EMG/NCS of both LE 2017 was said to be okay.     Blood test  Achr Ab (-)  PAULINA (-)  ESR 6  RF (-)  TSH (-)    ROS   (-) fever  (-) rash  All other systems reviewed and are negative    Past Medical Hx  Past Medical History:   Diagnosis Date    Anxiety     Atypical lobular hyperplasia of breast 2011    LEFT breast     Degenerative lumbar spinal stenosis     History of seasonal allergies     PONV (postoperative nausea and vomiting)     Pure hypercholesterolemia 3/18/2011    PVCs (premature ventricular contractions) 2009    Has resolved       Social Hx  Social History     Socioeconomic History    Marital status:      Spouse name: Not on file    Number of children: Not on file    Years of education: Not on file    Highest education level: Not on file   Tobacco Use    Smoking status: Never Smoker    Smokeless tobacco: Never Used   Substance and Sexual Activity    Alcohol use: Yes     Alcohol/week: 7.0 standard drinks     Types: 7 Glasses of wine per week    Drug use: No   Other Topics Concern     Social Determinants of Health     Financial Resource Strain:     Difficulty of Paying Living Expenses:    Food Insecurity:     Worried About Running Out of Food in the Last Year:     [address] in the Last Year:    Transportation Needs:     Lack of Transportation (Medical):  Lack of Transportation (Non-Medical):    Physical Activity:     Days of Exercise per Week:     Minutes of Exercise per Session:    Stress:     Feeling of Stress :    Social Connections:     Frequency of Communication with Friends and Family:     Frequency of Social Gatherings with Friends and Family:     Attends Denominational Services:     Active Member of Clubs or Organizations:     Attends Club or Organization Meetings:     Marital Status:        Family Hx  Family History   Problem Relation Age of Onset    Diabetes Mother     Heart Disease Mother     Heart Disease Father     Lung Cancer Father     Heart Surgery Father     Cancer Maternal Grandmother         breast    Breast Cancer Maternal Grandmother 36        age 42's    Arrhythmia Sister         PAT    Diabetes Brother    Sister has arnold CHiari and Jackie Betts symptoms    ALLERGIES  Allergies   Allergen Reactions    Metoprolol Other (comments)     Dropped HR too low    Pcn [Penicillins] Hives and Rash    Zithromax [Azithromycin] Other (comments)     Stomach cramps       CURRENT MEDS  Current Outpatient Medications   Medication Sig Dispense Refill    gabapentin (NEURONTIN) 300 mg capsule Take 300 mg by mouth two (2) times a day.       cholecalciferol (Vitamin D3) (5000 Units/125 mcg) tab tablet Take 5,000 Units by mouth daily.  rosuvastatin (CRESTOR) 20 mg tablet Take 20 mg by mouth nightly.  MULTIVITAMIN PO Take 1 Tab by mouth daily.  estradiol (CLIMARA) 0.075 mg/24 hr ptwk 1 Patch by TransDERmal route two (2) times a week.  escitalopram oxalate (LEXAPRO) 20 mg tablet Take 20 mg by mouth every morning.  acetaminophen (TYLENOL) 500 mg tablet Take 2 Tabs by mouth every six (6) hours. (Patient not taking: Reported on 9/8/2021) 60 Tab 1    cetirizine (ZYRTEC) 10 mg tablet Take 10 mg by mouth every morning. (Patient not taking: Reported on 9/8/2021)      phenylephrine hcl (SUDAFED PE) 10 mg tab Take 1 Tab by mouth as needed. (Patient not taking: Reported on 9/8/2021)             PREVIOUS WORKUP: (reviewed)  IMAGING:    CT Results (recent):  Results from [place] encounter on 10/07/09    [facility] [place]    Narrative  Final Report    ICD Codes / Adm. Diagnosis:    /   CHEST PAIN;SOB;PALPITATIONS  Examination:  CARDIAC SCORING  - [id number] - Oct  7 2009  3:26PM  Accession No:  [id number]  Reason:  CALCIUM SCORING      REPORT:  Technique: 5 mm gated axial images were obtained through the chest.  Postprocessing was performed. FINDINGS: The heart size is normal. There is no mediastinal lymphadenopathy. The thoracic aorta and main pulmonary artery are normal in caliber. No  pulmonary nodule or mass is seen. There is no focal airspace disease or  pleural effusion. The visualized osseous structures are unremarkable. No  chest wall or breast mass is evident. The visualized abdominal organs are  unremarkable. IMPRESSION: No evidence of acute cardiopulmonary process.  The calcium  scoring will be reported separately by the cardiologist.        Interpreting/Reading Doctor: Pascual Higgins ([id number])  Transcribed: n/a on 10/08/2009  Approved: Pascual Higgins ([id number])  10/08/2009        Distribution:  Attending Doctor: Demond Lopez  Alternate Doctor: Demond Lopez      MRI Results (recent):  Results from Hospital Encounter encounter on 12/06/13    MRI BREAST RODRÍGUEZ WO W CON CAD EX    Narrative  **Final Report**      ICD Codes / Adm. Diagnosis: 611.1  465.9 / Hypertrophy of breast  Examination:  MR BREAST RODRÍGUEZ Barger Conception CON CAD EX  - [id number] - Dec  6 2013  3:02PM  Accession No:  [id number]  Reason:  MRI [facility] lifetime risk 46%      REPORT:  HISTORY:  79-year-old female with history of ALH and [address] [place] [address] Drive on left breast  biopsy followed by surgical excision 2011. Lifetime risk assessment for  breast carcinoma greater than 20%. Recent mammogram showing a benign  appearing lymph node in the left upper outer quadrant and a biopsy clip in  the lower left breast (BI-RADS 2). EXAM: BILATERAL BREAST MRI WITH AND WITHOUT CONTRAST. CONTRAST: 20 mL Magnevist.    ANESTHESIA: None. PROCEDURE: Bilateral high resolution dynamic and morphologic enhanced breast  MRI was performed using a dedicated breast coil in the prone position. Pre-  and postcontrast axial and sagittal T1 and STIR images were performed. These  were post-processed using CAD kinetic analysis, digital subtraction,  enhancement curves and 2D and 3D reconstructions. COMPARISON:  MRI 10/23/2012. Recent mammography and ultrasonography  10/25/2013 . FINDINGS: Background parenchymal enhancement is moderate, with slightly  increased dynamic enhancement since the prior study, particularly on the  left. Left breast: A biopsy clip is stable at 6:00. Foci of delayed and dynamic  enhancement are scattered throughout the breasts, slightly better visualized  but roughly stable. The most prominent of these lie at 3:00 (3 mm) and 12:00  (3 mm). A small benign appearing mass at 12:00 is less conspicuous. It is  unclear which if any of these correspond with the recently sonographically  described lymph node at 1:00.  There are no new dominant abnormalities of  morphology or enhancement to suggest malignancy. No lymphadenopathy. Right breast: Scattered foci and small areas of non-masslike enhancement  which are stable. No new abnormality of morphology or enhancement. No  lymphadenopathy. Impression  :  1. BI-RADS 2, benign. 2. LEFT BREAST: Stable postoperative and benign appearing changes. No MRI  sign of malignancy at this time. 3. RIGHT BREAST: No MRI sign of malignancy. Stable benign-appearing changes. 4. RECOMMENDATION: Annual screening mammography and annual screening MRI in  this high-risk patient. 5. A summary portfolio has been created for reference and is available in  PACS. A negative breast MRI examination has high sensitivity and moderate  specificity, and speaks strongly against invasive cancer down to a detection  threshold of  4-5 mm, but may not detect some lower grade or in situ  carcinomas. Therefore, MRI should not be used to avoid an o/w indicated  biopsy. Due to the prone positioning for this exam, the described location  of MR findings may differ from other studies. Routine clinical and  mammographic followup are recommended. When available, pathology followup is  appreciated. Signing/Reading Doctor: Lajoyce Klinefelter ([id number])  Approved: Lajoyce Klinefelter ([id number])  Dec  6 2013  6:30PM      IR Results (recent):  No results found for this or any previous visit. VAS/US Results (recent):  No results found for this or any previous visit.           LABS (reviewed)  Results for orders placed or performed during the hospital encounter of 08/04/21   CBC WITH AUTOMATED DIFF   Result Value Ref Range    WBC 8.3 3.6 - 11.0 K/uL    RBC 4.33 3.80 - 5.20 M/uL    HGB 14.0 11.5 - 16.0 g/dL    HCT 41.3 35.0 - 47.0 %    MCV 95.4 80.0 - 99.0 FL    MCH 32.3 26.0 - 34.0 PG    MCHC 33.9 30.0 - 36.5 g/dL    RDW 12.8 11.5 - 14.5 %    PLATELET 862 135 - 093 K/uL    MPV 8.9 8.9 - 12.9 FL    NRBC 0.0 0  WBC    ABSOLUTE NRBC 0.00 0.00 - 0.01 K/uL NEUTROPHILS 63 32 - 75 %    LYMPHOCYTES 26 12 - 49 %    MONOCYTES 8 5 - 13 %    EOSINOPHILS 1 0 - 7 %    BASOPHILS 1 0 - 1 %    IMMATURE GRANULOCYTES 1 (H) 0.0 - 0.5 %    ABS. NEUTROPHILS 5.4 1.8 - 8.0 K/UL    ABS. LYMPHOCYTES 2.1 0.8 - 3.5 K/UL    ABS. MONOCYTES 0.6 0.0 - 1.0 K/UL    ABS. EOSINOPHILS 0.1 0.0 - 0.4 K/UL    ABS. BASOPHILS 0.1 0.0 - 0.1 K/UL    ABS. IMM. GRANS. 0.0 0.00 - 0.04 K/UL    DF AUTOMATED     METABOLIC PANEL, COMPREHENSIVE   Result Value Ref Range    Sodium 133 (L) 136 - 145 mmol/L    Potassium 4.1 3.5 - 5.1 mmol/L    Chloride 98 97 - 108 mmol/L    CO2 33 (H) 21 - 32 mmol/L    Anion gap 2 (L) 5 - 15 mmol/L    Glucose 92 65 - 100 mg/dL    BUN 14 6 - 20 MG/DL    Creatinine 0.66 0.55 - 1.02 MG/DL    BUN/Creatinine ratio 21 (H) 12 - 20      GFR est AA >60 >60 ml/min/1.73m2    GFR est non-AA >60 >60 ml/min/1.73m2    Calcium 8.6 8.5 - 10.1 MG/DL    Bilirubin, total 0.4 0.2 - 1.0 MG/DL    ALT (SGPT) 34 12 - 78 U/L    AST (SGOT) 14 (L) 15 - 37 U/L    Alk. phosphatase 55 45 - 117 U/L    Protein, total 7.4 6.4 - 8.2 g/dL    Albumin 3.9 3.5 - 5.0 g/dL    Globulin 3.5 2.0 - 4.0 g/dL    A-G Ratio 1.1 1.1 - 2.2     SAMPLES BEING HELD   Result Value Ref Range    SAMPLES BEING HELD SST. RED     COMMENT        Add-on orders for these samples will be processed based on acceptable specimen integrity and analyte stability, which may vary by analyte.    TROPONIN I   Result Value Ref Range    Troponin-I, Qt. <0.05 <0.05 ng/mL   POC TROPONIN-I   Result Value Ref Range    Troponin-I (POC) <0.04 0.00 - 0.08 ng/mL   EKG, 12 LEAD, INITIAL   Result Value Ref Range    Ventricular Rate 79 BPM    Atrial Rate 79 BPM    P-R Interval 146 ms    QRS Duration 80 ms    Q-T Interval 382 ms    QTC Calculation (Bezet) 438 ms    Calculated P Axis 64 degrees    Calculated R Axis 53 degrees    Calculated T Axis 31 degrees    Diagnosis       Normal sinus rhythm  Possible Left atrial enlargement  Otherwise normal ECG  When compared with ECG of 30-JUL-2019 10:52,  Vent. rate has increased BY  26 BPM  Confirmed by Darin Galdamez MD, [id number] ([id number]) on 8/5/2021 8:15:22 AM         Physical Exam:     Visit Vitals  /82 (BP 1 Location: Left arm, BP Patient Position: Sitting, BP Cuff Size: Adult)   Pulse 75   Temp 98.1 °F (36.7 °C) (Temporal)   Wt 60.8 kg (134 lb)   LMP  (LMP Unknown)   SpO2 100%   BMI 21.63 kg/m²     General:  Alert, cooperative, no distress. Head:  Normocephalic, without obvious abnormality, atraumatic. Eyes:  Conjunctivae/corneas clear. Lungs:  Heart:   Non labored breathing  Regular rate and rhythm, no carotid bruits   Abdomen:   Soft, non-distended   Extremities: Extremities normal, atraumatic, no cyanosis or edema. Pulses: 2+ and symmetric all extremities. Skin: Skin color, texture, turgor normal. No rashes or lesions.   Neurologic Exam     Gen: Attention normal             Language: naming, repetition, fluency normal             Memory: intact recent and remote memory  Cranial Nerves:  I: smell Not tested   II: visual fields Full to confrontation   II: pupils Equal, round, reactive to light   II: optic disc No papilledema   III,VII: ptosis none   III,IV,VI: extraocular muscles  Full ROM   V: mastication normal   V: facial light touch sensation  normal   VII: facial muscle function   symmetric   VIII: hearing symmetric   IX: soft palate elevation  normal   XI: trapezius strength  5/5   XI: sternocleidomastoid strength 5/5   XI: neck flexion strength  5/5   XII: tongue  midline     Motor: normal bulk and tone, no tremor              Strength: 5/5 all four extremities  Sensory: intact to LT, PP, vibration, and JPS  Reflexes: 2+ UE, 3+ knees and 2+ ankles throughout; Down going toes  Coordination: Good FTN and HTS  Gait: normal gait including tandem            Impression:     Gabriela Santamaria is a 61 y.o. female who  has a past medical history of Anxiety, Atypical lobular hyperplasia of breast (2011), Degenerative lumbar spinal stenosis, History of seasonal allergies, PONV (postoperative nausea and vomiting), Pure hypercholesterolemia (3/18/2011), and PVCs (premature ventricular contractions) (2009). who comes in with constellation of symptoms. Since 2016, patient noted pressure behind her eyes, R worse than L. Recently had severe R periorbital pain and developed L sided weakness. Consideration includes complex migraine. CT head was said to be okay as per patient. Second, patient will have episodes of random patchy tingling pin prick sensations, L cheek and L forearm and both feet which is non-specific in etiology. Differentials included small fiber neuropathy and anxiety related. Lastly, patient has chronic neck and lower back pain seeing  [facility] [address] Pranav King and had L4L5 laminectomy Aug 2019 with improvement of mobility of lower back pain. Saw a rheumatologist Dr Anette Severance who said everything was okay and who thought she might have Fibromyalgia and placed on Gabapentin. RECOMMENDATIONS  1. I had a long discussion with patient. Discussed diagnosis, prognosis, pathophysiology and available treatment. Reviewed test results. All questions were answered. 2. Offered doing MRI brain to rule out structural cause of symptoms. Patient declined for now  3. Discussed doing EMG/NCS of both UE. Patient deferred  4. Reviewed medical records from outside  5. Consider checking Vit B12 and Vit B6 levels c/o PCP if not done in the past  6. Patient already taking Gabapentin c/o Dr Jordin Morales and Dispositions    · Return if symptoms worsen or fail to improve.             Thank you for the consultation      Sam Chapman MD  Diplomate, American Board of Psychiatry and Neurology  Diplomate, Neuromuscular Medicine  Diplomate, American Board of Electrodiagnostic Medicine        CC: Nico Johnson MD  Fax: [phone]

## 2021-09-08 NOTE — PROGRESS NOTES
Chief Complaint   Patient presents with    Visual Field Change     Pain in and behind right eye, causing blurred vision, balance issues, neuropathy in legs, weakness in extremities     Visit Vitals  /82 (BP 1 Location: Left arm, BP Patient Position: Sitting, BP Cuff Size: Adult)   Pulse 75   Temp 98.1 °F (36.7 °C) (Temporal)   Wt 60.8 kg (134 lb)   LMP  (LMP Unknown)   SpO2 100%   BMI 21.63 kg/m²

## 2021-12-18 NOTE — H&P
Preoperative Evaluation                     History and Physical with Surgical Risk Stratification     7/30/2019    CC: Low back pain  Surgery: L4-5 OLIF     HPI:   [place] Cazares is a 53 y.o. female referred for pre-operative evaluation by Dr. Bárbara Deras for surgery on 8/13/19. She has had chronic low back pain for many years. Had a fall about 6 years ago down a flight of steps. Never really did anything major about the discomfort at the time. As time passed her back started bothering her more. Couple of years ago she started with the severe pain and sensory disturbances in her legs. She notes it feels like electrical vibration in both legs with equal sensation in both. Her low back has a deep ache. She has bad balance also with a couple of small falls. Bilateral weak legs. Her leg will buckle and give out. She has had to stop stretching, bending over, getting up from floor. She feels less back pain when she is on her feet and walking. Sitting is painful and laying down is severe. 6-10/10. Has to sleep on her stomach. Worse over the last 6 months. She has failed PT, injections, nerve testing, Gabapentin, MR, pain management. The patient was evaluated in the surgeon's office and it was determined that the most appropriate plan of care is to proceed with surgical intervention. Patient's PCP Lisa Pierre MD      Review of Systems     Constitutional: Negative for chills and fever  HENT: Negative for congestion and sore throat  Eyes: negative for blurred vision and double vision  Respiratory: Negative for cough, shortness of breath and wheezing  Mouth: Negative for loose, broken or chipped teeth. Negative for dentures  Cardiovascular: Negative for chest pain and palpitations  Gastrointestinal: Negative for abdominal pain, (+) constipation, diarrhea and nausea  Genitourinary: Negative for dysuria and hematuria.  Positive for urination difficulty when pain is severe  Musculoskeletal: Positive for low back pain  Skin: Negative for rash, open wounds. Negative for bruises easily  Neurological: Negative for dizziness, tremors and headaches  Psychiatric: Negative for depression. The patient is not nervous/anxious. Inherent Risk of Surgery     Surgical risk:  Intermediate  Low:  Intermediate:  Orthopedic, High:    Patient Cardiac Risk Assessment     Revised Cardiac Risk Index (RCRI)    Rate if cardiac death, nonfatal MI, nonfatal cardiac arrest by number of risk factor- None - 0.4%    ARNUFLO/AHA 2007 Guidelines:   1) Surgery Emergency, Non-cardiac -> to surgery  2) If not, look at clinical predictors    Major Intermediate Minor       Blood Thinner: None    METS      EQUAL TO 4 Care for self Walk indoors around house Walk 2-3 blocks on level ground (2-3 mph) Light work around house (dust, dishes)     Other Risk Factors:   Screening for ETOH use:  Done and low risk  Smoking status:   None    Personal or FH of bleeding problems:  No  Personal or FH of blood clots:  No  Personal or FH of anesthesia problems:   No    Pulmonary Risk:  Asthma or COPD:  No  Body mass index is 22.71 kg/m². Known TRENT:  No  Albumin normal, BUN normal    Past Medical, Surgical, Social History     Allergies: Allergies   Allergen Reactions    Metoprolol Other (comments)     Dropped HR too low    Pcn [Penicillins] Hives and Rash    Zithromax [Azithromycin] Other (comments)     Stomach cramps         Current Outpatient Medications   Medication Sig    rosuvastatin (CRESTOR) 20 mg tablet Take 20 mg by mouth nightly.  cetirizine (ZYRTEC) 10 mg tablet Take 10 mg by mouth every morning.  MULTIVITAMIN PO Take 1 Tab by mouth daily.  estradiol (CLIMARA) 0.075 mg/24 hr ptwk 1 Patch by TransDERmal route two (2) times a week.  phenylephrine hcl (SUDAFED PE) 10 mg tab Take 1 Tab by mouth as needed.  escitalopram oxalate (LEXAPRO) 20 mg tablet Take 20 mg by mouth every morning.      No current facility-administered medications for this encounter. Past Medical History:   Diagnosis Date    Anxiety     Atypical lobular hyperplasia of breast 2011    LEFT breast     Degenerative lumbar spinal stenosis     History of seasonal allergies     PONV (postoperative nausea and vomiting)     Pure hypercholesterolemia 3/18/2011    PVCs (premature ventricular contractions) 2009    Has resolved     Past Surgical History:   Procedure Laterality Date    HX BREAST BIOPSY Right 1996    RIGHT-Benign    HX BREAST BIOPSY Left 7/2011    LEFT-ADH    HX COLONOSCOPY N/A     HX GYN      reanastomosis of tubes    HX GYN  7/2012    Uterine ablation    HX HYSTERECTOMY N/A     HX ROTATOR CUFF REPAIR Right 2010     Social History     Tobacco Use    Smoking status: Never Smoker    Smokeless tobacco: Never Used   Substance Use Topics    Alcohol use: Yes     Alcohol/week: 7.0 standard drinks     Types: 7 Glasses of wine per week    Drug use: No       Objective     Vitals:    07/30/19 0947   BP: 128/67   Pulse: (!) 59   Resp: 18   Temp: 98.2 °F (36.8 °C)   SpO2: 99%   Weight: 63.8 kg (140 lb 11.2 oz)   Height: 5' 6\" (1.676 m)       Constitutional:  Appears well,  No Acute Distress, Vitals noted  Psychiatric:   Affect normal, Alert and Oriented to person/place/time    Eyes:   Pupils equally round and reactive, EOMI, conjunctiva clear, eyelids normal  ENT:   External ears and nose normal/lips, teeth normal, gums normal, TMs and Orophyarynx normal  Neck:   general inspection and Thyroid normal.  No abnormal cervical or supraclavicular nodes    Lungs:   clear to auscultation, good respiratory effort  Heart: Ausculation normal.  Bradycardia. No cardiac murmurs.   No carotid bruits or palpable thrills  Chest wall normal  Musculoskeletal: Gait antalgic   Extremities:   without edema, good peripheral pulses  Skin:   Warm to palpation, without rashes, bruising, or suspicious lesions       DVT /PE Risk Assessment      Bedridden for more than 3 days or major surgery within the last 4 weeks NO    Active cancer NO    Calf swelling >3 cm compared to other leg NO    Collateral superficial veins present NO    Entire leg swollen NO    Tenderness along the deep venous system & or pitting edema confined to symptomatic leg NO    Cast applied to lower limb, paralysis NO    Previous DVT NO    Recent Results (from the past 72 hour(s))   CBC WITH AUTOMATED DIFF    Collection Time: 07/30/19 10:36 AM   Result Value Ref Range    WBC 5.4 3.6 - 11.0 K/uL    RBC 4.23 3.80 - 5.20 M/uL    HGB 13.3 11.5 - 16.0 g/dL    HCT 40.4 35.0 - 47.0 %    MCV 95.5 80.0 - 99.0 FL    MCH 31.4 26.0 - 34.0 PG    MCHC 32.9 30.0 - 36.5 g/dL    RDW 12.5 11.5 - 14.5 %    PLATELET 858 246 - 872 K/uL    MPV 9.8 8.9 - 12.9 FL    NRBC 0.0 0  WBC    ABSOLUTE NRBC 0.00 0.00 - 0.01 K/uL    NEUTROPHILS 52 32 - 75 %    LYMPHOCYTES 36 12 - 49 %    MONOCYTES 9 5 - 13 %    EOSINOPHILS 2 0 - 7 %    BASOPHILS 1 0 - 1 %    IMMATURE GRANULOCYTES 0 0.0 - 0.5 %    ABS. NEUTROPHILS 2.8 1.8 - 8.0 K/UL    ABS. LYMPHOCYTES 1.9 0.8 - 3.5 K/UL    ABS. MONOCYTES 0.5 0.0 - 1.0 K/UL    ABS. EOSINOPHILS 0.1 0.0 - 0.4 K/UL    ABS. BASOPHILS 0.1 0.0 - 0.1 K/UL    ABS. IMM. GRANS. 0.0 0.00 - 0.04 K/UL    DF AUTOMATED     METABOLIC PANEL, COMPREHENSIVE    Collection Time: 07/30/19 10:36 AM   Result Value Ref Range    Sodium 135 (L) 136 - 145 mmol/L    Potassium 5.0 3.5 - 5.1 mmol/L    Chloride 99 97 - 108 mmol/L    CO2 34 (H) 21 - 32 mmol/L    Anion gap 2 (L) 5 - 15 mmol/L    Glucose 74 65 - 100 mg/dL    BUN 10 6 - 20 MG/DL    Creatinine 0.68 0.55 - 1.02 MG/DL    BUN/Creatinine ratio 15 12 - 20      GFR est AA >60 >60 ml/min/1.73m2    GFR est non-AA >60 >60 ml/min/1.73m2    Calcium 9.3 8.5 - 10.1 MG/DL    Bilirubin, total 0.4 0.2 - 1.0 MG/DL    ALT (SGPT) 55 12 - 78 U/L    AST (SGOT) 36 15 - 37 U/L    Alk.  phosphatase 64 45 - 117 U/L    Protein, total 7.5 6.4 - 8.2 g/dL    Albumin 4.2 3.5 - 5.0 g/dL    Globulin 3.3 2.0 - 4.0 g/dL    A-G Ratio 1.3 1.1 - 2.2     HEMOGLOBIN A1C WITH EAG    Collection Time: 07/30/19 10:36 AM   Result Value Ref Range    Hemoglobin A1c 5.6 4.2 - 6.3 %    Est. average glucose 114 mg/dL   PROTHROMBIN TIME + INR    Collection Time: 07/30/19 10:36 AM   Result Value Ref Range    INR 1.0 0.9 - 1.1      Prothrombin time 10.3 9.0 - 11.1 sec   PTT    Collection Time: 07/30/19 10:36 AM   Result Value Ref Range    aPTT 25.9 22.1 - 32.0 sec    aPTT, therapeutic range     58.0 - 77.0 SECS   URINALYSIS W/ REFLEX CULTURE    Collection Time: 07/30/19 10:36 AM   Result Value Ref Range    Color YELLOW/STRAW      Appearance CLEAR CLEAR      Specific gravity 1.008 1.003 - 1.030      pH (UA) 7.0 5.0 - 8.0      Protein NEGATIVE  NEG mg/dL    Glucose NEGATIVE  NEG mg/dL    Ketone NEGATIVE  NEG mg/dL    Bilirubin NEGATIVE  NEG      Blood NEGATIVE  NEG      Urobilinogen 0.2 0.2 - 1.0 EU/dL    Nitrites NEGATIVE  NEG      Leukocyte Esterase NEGATIVE  NEG      WBC 0-4 0 - 4 /hpf    RBC 0-5 0 - 5 /hpf    Epithelial cells FEW FEW /lpf    Bacteria NEGATIVE  NEG /hpf    UA:UC IF INDICATED CULTURE NOT INDICATED BY UA RESULT CNI      Hyaline cast 0-2 0 - 5 /lpf   VITAMIN D, 25 HYDROXY    Collection Time: 07/30/19 10:36 AM   Result Value Ref Range    Vitamin D 25-Hydroxy 35.1 30 - 100 ng/mL   TYPE & SCREEN    Collection Time: 07/30/19 10:37 AM   Result Value Ref Range    Crossmatch Expiration 08/13/2019     ABO/Rh(D) A POSITIVE     Antibody screen NEG    EKG, 12 LEAD, INITIAL    Collection Time: 07/30/19 10:52 AM   Result Value Ref Range    Ventricular Rate 53 BPM    Atrial Rate 53 BPM    P-R Interval 172 ms    QRS Duration 92 ms    Q-T Interval 450 ms    QTC Calculation (Bezet) 422 ms    Calculated P Axis 75 degrees    Calculated R Axis 68 degrees    Calculated T Axis 55 degrees    Diagnosis       Sinus bradycardia  Otherwise normal ECG  When compared with ECG of 20-JUN-2017 19:03,  No significant change was found  Confirmed by Bethany Cui MD, [id number]) on 7/30/2019 2:04:20 PM         Assessment and Plan     Assessment/Plan:   1) Lumbar Stenosis  2) Pre-Operative Evaluation    Labs and EKG reviewed. MRSA pending  Vitamin D level 35.1- script sent in per surgeons protocol    Preoperative Clearance  Per RCRI, the patient has a 0.4% risk of cardiac death, nonfatal MI, nonfatal cardiac arrest based on no risk factors. Per ACC/AHA guidelines, patient is low risk for a(n) intermediate risk surgery and may proceed to planned surgery with the above noted risk.     Jie Littlejohn NP no

## 2022-03-19 PROBLEM — M48.062 LUMBAR STENOSIS WITH NEUROGENIC CLAUDICATION: Status: ACTIVE | Noted: 2019-08-13

## 2022-05-02 ENCOUNTER — TRANSCRIBE ORDER (OUTPATIENT)
Dept: SCHEDULING | Age: 57
End: 2022-05-02

## 2022-05-02 DIAGNOSIS — Z12.31 VISIT FOR SCREENING MAMMOGRAM: Primary | ICD-10-CM

## 2022-05-23 ENCOUNTER — HOSPITAL ENCOUNTER (OUTPATIENT)
Dept: MAMMOGRAPHY | Age: 57
Discharge: HOME | End: 2022-05-23
Attending: SURGERY

## 2022-06-09 ENCOUNTER — HOSPITAL ENCOUNTER (OUTPATIENT)
Dept: MAMMOGRAPHY | Age: 57
Discharge: HOME | End: 2022-06-09
Attending: SURGERY

## 2022-06-09 DIAGNOSIS — Z12.31 VISIT FOR SCREENING MAMMOGRAM: ICD-10-CM

## 2022-06-21 ENCOUNTER — HOSPITAL ENCOUNTER (OUTPATIENT)
Dept: MAMMOGRAPHY | Age: 57
Discharge: HOME | End: 2022-06-21
Attending: SURGERY
Payer: COMMERCIAL

## 2022-06-21 DIAGNOSIS — Z87.898 HISTORY OF LUMP OF RIGHT BREAST: Primary | ICD-10-CM

## 2022-06-21 DIAGNOSIS — Z12.31 SCREENING MAMMOGRAM, ENCOUNTER FOR: ICD-10-CM

## 2022-06-21 DIAGNOSIS — Z87.898 HISTORY OF LUMP OF RIGHT BREAST: ICD-10-CM

## 2022-06-21 PROCEDURE — 77063 BREAST TOMOSYNTHESIS BI: CPT

## 2023-03-20 ENCOUNTER — TRANSCRIBE ORDER (OUTPATIENT)
Dept: SCHEDULING | Age: 58
End: 2023-03-20

## 2023-03-20 DIAGNOSIS — M51.36 LUMBAR ADJACENT SEGMENT DISEASE WITH SPONDYLOLISTHESIS: Primary | ICD-10-CM

## 2023-03-20 DIAGNOSIS — M54.16 SPINAL STENOSIS OF LUMBAR REGION WITH RADICULOPATHY: ICD-10-CM

## 2023-03-20 DIAGNOSIS — M43.16 LUMBAR ADJACENT SEGMENT DISEASE WITH SPONDYLOLISTHESIS: Primary | ICD-10-CM

## 2023-03-20 DIAGNOSIS — M48.061 SPINAL STENOSIS OF LUMBAR REGION WITH RADICULOPATHY: ICD-10-CM

## 2023-03-20 DIAGNOSIS — M54.16 LUMBAR RADICULITIS: ICD-10-CM

## 2023-03-28 ENCOUNTER — HOSPITAL ENCOUNTER (OUTPATIENT)
Dept: CT IMAGING | Age: 58
Discharge: HOME | End: 2023-03-28
Attending: ORTHOPAEDIC SURGERY
Payer: COMMERCIAL

## 2023-03-28 DIAGNOSIS — M43.16 LUMBAR ADJACENT SEGMENT DISEASE WITH SPONDYLOLISTHESIS: ICD-10-CM

## 2023-03-28 DIAGNOSIS — M54.16 SPINAL STENOSIS OF LUMBAR REGION WITH RADICULOPATHY: ICD-10-CM

## 2023-03-28 DIAGNOSIS — M51.36 LUMBAR ADJACENT SEGMENT DISEASE WITH SPONDYLOLISTHESIS: ICD-10-CM

## 2023-03-28 DIAGNOSIS — M54.16 LUMBAR RADICULITIS: ICD-10-CM

## 2023-03-28 DIAGNOSIS — M48.061 SPINAL STENOSIS OF LUMBAR REGION WITH RADICULOPATHY: ICD-10-CM

## 2023-03-28 PROCEDURE — 72131 CT LUMBAR SPINE W/O DYE: CPT

## 2023-06-26 ENCOUNTER — TRANSCRIBE ORDERS (OUTPATIENT)
Facility: HOSPITAL | Age: 58
End: 2023-06-26

## 2023-06-26 DIAGNOSIS — Z12.31 SCREENING MAMMOGRAM FOR HIGH-RISK PATIENT: Primary | ICD-10-CM

## 2023-07-25 ENCOUNTER — HOSPITAL ENCOUNTER (OUTPATIENT)
Facility: HOSPITAL | Age: 58
Discharge: HOME | End: 2023-07-28
Attending: SURGERY
Payer: COMMERCIAL

## 2023-07-25 DIAGNOSIS — Z12.31 SCREENING MAMMOGRAM FOR HIGH-RISK PATIENT: ICD-10-CM

## 2023-07-25 PROCEDURE — 77063 BREAST TOMOSYNTHESIS BI: CPT

## 2023-08-17 ENCOUNTER — APPOINTMENT (OUTPATIENT)
Facility: HOSPITAL | Age: 58
End: 2023-08-17
Payer: COMMERCIAL

## 2023-08-17 ENCOUNTER — HOSPITAL ENCOUNTER (EMERGENCY)
Facility: HOSPITAL | Age: 58
Discharge: HOME | End: 2023-08-17
Attending: EMERGENCY MEDICINE
Payer: COMMERCIAL

## 2023-08-17 VITALS
WEIGHT: 130 LBS | DIASTOLIC BLOOD PRESSURE: 74 MMHG | SYSTOLIC BLOOD PRESSURE: 118 MMHG | HEART RATE: 78 BPM | HEIGHT: 67 IN | OXYGEN SATURATION: 97 % | RESPIRATION RATE: 16 BRPM | TEMPERATURE: 98.5 F | BODY MASS INDEX: 20.4 KG/M2

## 2023-08-17 DIAGNOSIS — I47.1 PAROXYSMAL SUPRAVENTRICULAR TACHYCARDIA (HCC): Primary | ICD-10-CM

## 2023-08-17 LAB
ALBUMIN SERPL-MCNC: 4 G/DL (ref 3.5–5)
ALBUMIN/GLOB SERPL: 1.1 (ref 1.1–2.2)
ALP SERPL-CCNC: 57 U/L (ref 45–117)
ALT SERPL-CCNC: 30 U/L (ref 12–78)
AMPHET UR QL SCN: NEGATIVE
ANION GAP SERPL CALC-SCNC: 11 MMOL/L (ref 5–15)
APPEARANCE UR: ABNORMAL
APTT PPP: 22.9 SEC (ref 22.1–31)
AST SERPL-CCNC: 20 U/L (ref 15–37)
BACTERIA URNS QL MICRO: NEGATIVE /HPF
BARBITURATES UR QL SCN: NEGATIVE
BASOPHILS # BLD: 0.1 K/UL (ref 0–0.1)
BASOPHILS NFR BLD: 1 % (ref 0–1)
BENZODIAZ UR QL: NEGATIVE
BILIRUB SERPL-MCNC: 0.3 MG/DL (ref 0.2–1)
BILIRUB UR QL: NEGATIVE
BUN SERPL-MCNC: 11 MG/DL (ref 6–20)
BUN/CREAT SERPL: 17 (ref 12–20)
CALCIUM SERPL-MCNC: 9.2 MG/DL (ref 8.5–10.1)
CANNABINOIDS UR QL SCN: NEGATIVE
CHLORIDE SERPL-SCNC: 97 MMOL/L (ref 97–108)
CK SERPL-CCNC: 68 U/L (ref 26–192)
CO2 SERPL-SCNC: 26 MMOL/L (ref 21–32)
COCAINE UR QL SCN: NEGATIVE
COLOR UR: ABNORMAL
CREAT SERPL-MCNC: 0.65 MG/DL (ref 0.55–1.02)
D DIMER PPP FEU-MCNC: 0.32 MG/L FEU (ref 0–0.65)
DIFFERENTIAL METHOD BLD: NORMAL
EOSINOPHIL # BLD: 0.1 K/UL (ref 0–0.4)
EOSINOPHIL NFR BLD: 1 % (ref 0–7)
EPITH CASTS URNS QL MICRO: ABNORMAL /LPF
ERYTHROCYTE [DISTWIDTH] IN BLOOD BY AUTOMATED COUNT: 11.8 % (ref 11.5–14.5)
GLOBULIN SER CALC-MCNC: 3.8 G/DL (ref 2–4)
GLUCOSE SERPL-MCNC: 105 MG/DL (ref 65–100)
GLUCOSE UR STRIP.AUTO-MCNC: NEGATIVE MG/DL
HCT VFR BLD AUTO: 39.2 % (ref 35–47)
HGB BLD-MCNC: 13.4 G/DL (ref 11.5–16)
HGB UR QL STRIP: ABNORMAL
IMM GRANULOCYTES # BLD AUTO: 0 K/UL (ref 0–0.04)
IMM GRANULOCYTES NFR BLD AUTO: 0 % (ref 0–0.5)
INR PPP: 1 (ref 0.9–1.1)
KETONES UR QL STRIP.AUTO: NEGATIVE MG/DL
LEUKOCYTE ESTERASE UR QL STRIP.AUTO: NEGATIVE
LYMPHOCYTES # BLD: 2.9 K/UL (ref 0.8–3.5)
LYMPHOCYTES NFR BLD: 32 % (ref 12–49)
Lab: NORMAL
MAGNESIUM SERPL-MCNC: 2 MG/DL (ref 1.6–2.4)
MCH RBC QN AUTO: 32.1 PG (ref 26–34)
MCHC RBC AUTO-ENTMCNC: 34.2 G/DL (ref 30–36.5)
MCV RBC AUTO: 93.8 FL (ref 80–99)
METHADONE UR QL: NEGATIVE
MONOCYTES # BLD: 0.7 K/UL (ref 0–1)
MONOCYTES NFR BLD: 8 % (ref 5–13)
NEUTS SEG # BLD: 5.5 K/UL (ref 1.8–8)
NEUTS SEG NFR BLD: 58 % (ref 32–75)
NITRITE UR QL STRIP.AUTO: NEGATIVE
NRBC # BLD: 0 K/UL (ref 0–0.01)
NRBC BLD-RTO: 0 PER 100 WBC
OPIATES UR QL: NEGATIVE
PCP UR QL: NEGATIVE
PH UR STRIP: 7 (ref 5–8)
PHOSPHATE SERPL-MCNC: 3 MG/DL (ref 2.6–4.7)
PLATELET # BLD AUTO: 329 K/UL (ref 150–400)
PMV BLD AUTO: 9.8 FL (ref 8.9–12.9)
POTASSIUM SERPL-SCNC: 4.2 MMOL/L (ref 3.5–5.1)
PROT SERPL-MCNC: 7.8 G/DL (ref 6.4–8.2)
PROT UR STRIP-MCNC: NEGATIVE MG/DL
PROTHROMBIN TIME: 10 SEC (ref 9–11.1)
RBC # BLD AUTO: 4.18 M/UL (ref 3.8–5.2)
RBC #/AREA URNS HPF: ABNORMAL /HPF (ref 0–5)
SODIUM SERPL-SCNC: 134 MMOL/L (ref 136–145)
SP GR UR REFRACTOMETRY: 1.01 (ref 1–1.03)
THERAPEUTIC RANGE: NORMAL SECS (ref 58–77)
TROPONIN I SERPL HS-MCNC: <4 NG/L (ref 0–51)
TSH SERPL DL<=0.05 MIU/L-ACNC: 2.65 UIU/ML (ref 0.36–3.74)
URINE CULTURE IF INDICATED: ABNORMAL
UROBILINOGEN UR QL STRIP.AUTO: 0.2 EU/DL (ref 0.2–1)
WBC # BLD AUTO: 9.2 K/UL (ref 3.6–11)
WBC URNS QL MICRO: ABNORMAL /HPF (ref 0–4)

## 2023-08-17 PROCEDURE — 83735 ASSAY OF MAGNESIUM: CPT

## 2023-08-17 PROCEDURE — 96374 THER/PROPH/DIAG INJ IV PUSH: CPT

## 2023-08-17 PROCEDURE — 80053 COMPREHEN METABOLIC PANEL: CPT

## 2023-08-17 PROCEDURE — 84443 ASSAY THYROID STIM HORMONE: CPT

## 2023-08-17 PROCEDURE — 71045 X-RAY EXAM CHEST 1 VIEW: CPT

## 2023-08-17 PROCEDURE — 85025 COMPLETE CBC W/AUTO DIFF WBC: CPT

## 2023-08-17 PROCEDURE — 80307 DRUG TEST PRSMV CHEM ANLYZR: CPT

## 2023-08-17 PROCEDURE — 6360000002 HC RX W HCPCS: Performed by: EMERGENCY MEDICINE

## 2023-08-17 PROCEDURE — 36415 COLL VENOUS BLD VENIPUNCTURE: CPT

## 2023-08-17 PROCEDURE — 82550 ASSAY OF CK (CPK): CPT

## 2023-08-17 PROCEDURE — 93005 ELECTROCARDIOGRAM TRACING: CPT | Performed by: EMERGENCY MEDICINE

## 2023-08-17 PROCEDURE — 96361 HYDRATE IV INFUSION ADD-ON: CPT

## 2023-08-17 PROCEDURE — 6370000000 HC RX 637 (ALT 250 FOR IP): Performed by: EMERGENCY MEDICINE

## 2023-08-17 PROCEDURE — 2580000003 HC RX 258: Performed by: EMERGENCY MEDICINE

## 2023-08-17 PROCEDURE — 85610 PROTHROMBIN TIME: CPT

## 2023-08-17 PROCEDURE — 99285 EMERGENCY DEPT VISIT HI MDM: CPT

## 2023-08-17 PROCEDURE — 85730 THROMBOPLASTIN TIME PARTIAL: CPT

## 2023-08-17 PROCEDURE — 84484 ASSAY OF TROPONIN QUANT: CPT

## 2023-08-17 PROCEDURE — 81001 URINALYSIS AUTO W/SCOPE: CPT

## 2023-08-17 PROCEDURE — 85379 FIBRIN DEGRADATION QUANT: CPT

## 2023-08-17 PROCEDURE — 84100 ASSAY OF PHOSPHORUS: CPT

## 2023-08-17 RX ORDER — ASPIRIN 325 MG
325 TABLET ORAL
Status: COMPLETED | OUTPATIENT
Start: 2023-08-17 | End: 2023-08-17

## 2023-08-17 RX ORDER — EZETIMIBE 10 MG/1
10 TABLET ORAL DAILY
COMMUNITY

## 2023-08-17 RX ORDER — CELECOXIB 200 MG/1
200 CAPSULE ORAL DAILY
COMMUNITY

## 2023-08-17 RX ORDER — FEXOFENADINE HCL 180 MG/1
180 TABLET ORAL DAILY
COMMUNITY

## 2023-08-17 RX ORDER — ADENOSINE 3 MG/ML
6 INJECTION, SOLUTION INTRAVENOUS ONCE
Status: COMPLETED | OUTPATIENT
Start: 2023-08-17 | End: 2023-08-17

## 2023-08-17 RX ORDER — 0.9 % SODIUM CHLORIDE 0.9 %
1000 INTRAVENOUS SOLUTION INTRAVENOUS ONCE
Status: COMPLETED | OUTPATIENT
Start: 2023-08-17 | End: 2023-08-17

## 2023-08-17 RX ADMIN — SODIUM CHLORIDE 1000 ML: 9 INJECTION, SOLUTION INTRAVENOUS at 19:01

## 2023-08-17 RX ADMIN — ADENOSINE 6 MG: 3 INJECTION, SOLUTION INTRAVENOUS at 19:00

## 2023-08-17 RX ADMIN — ASPIRIN 325 MG: 325 TABLET ORAL at 19:02

## 2023-08-17 ASSESSMENT — PAIN SCALES - GENERAL: PAINLEVEL_OUTOF10: 4

## 2023-08-17 ASSESSMENT — PAIN DESCRIPTION - ORIENTATION: ORIENTATION: MID

## 2023-08-17 ASSESSMENT — PAIN DESCRIPTION - DESCRIPTORS: DESCRIPTORS: ACHING

## 2023-08-17 ASSESSMENT — LIFESTYLE VARIABLES: HOW OFTEN DO YOU HAVE A DRINK CONTAINING ALCOHOL: PATIENT DECLINED

## 2023-08-17 ASSESSMENT — PAIN DESCRIPTION - LOCATION: LOCATION: CHEST

## 2023-08-17 NOTE — ED PROVIDER NOTES
[facility] EMERGENCY DEPT  EMERGENCY DEPARTMENT ENCOUNTER      Pt Name: Boni Mayers  MRN: [id number]  [address] [facility] [place] 1965  Date of evaluation: 8/17/2023  Provider: Angelito Henderson MD    [address]       Chief Complaint   Patient presents with    Tachycardia    Dizziness         HISTORY OF PRESENT ILLNESS   (Location/Symptom, Timing/Onset, Context/Setting, Quality, Duration, Modifying Factors, Severity)  Note limiting factors. 50-year-old female with a past medical history significant for palpitations, hypercholesterolemia, spinal stenosis, chronic chest pain, mastodynia, breast cancer who presents to the ER accompanied by family with a complaint of sudden onset of palpitation she describes her heart beating fast and irregularly that occurred less than hour prior to arrival to the ER. The patient also admits to feeling lightheadedness, dizzy and sharp stabbing chest pain, severity 2 out of 10, without any aggravation or relieving factors. Patient states that she has similar episode in the past several years ago. She had 1 glass of wine this evening prior to dinner. She denies any fever and chills, headache, sore throat, cough, congestion, nausea, vomiting, diarrhea, constipation, dysuria, extremity weakness or numbness, sick contact, skin rash or recent travel, drug consumption. Review of External Medical Records:     Nursing Notes were reviewed. REVIEW OF SYSTEMS    (2-9 systems for level 4, 10 or more for level 5)     Review of Systems   All other systems reviewed and are negative. Except as noted above the remainder of the review of systems was reviewed and negative.        PAST MEDICAL HISTORY     Past Medical History:   Diagnosis Date    Anxiety     Atypical lobular hyperplasia of breast 2011    LEFT breast     Degenerative lumbar spinal stenosis     History of seasonal allergies     PONV (postoperative nausea and vomiting)     Pure hypercholesterolemia 3/18/2011    PVCs (premature

## 2023-08-17 NOTE — ED TRIAGE NOTES
Patient presents to treatment area via wheelchair. Patient states that about 35 minutes ago, she bent over and \"felt my heart catch\". Has since been having breathlessness, dizziness, and chest tightness. Pulse 160's in triage. Dr. Neeraj Plata at bedside.

## 2023-08-17 NOTE — ED NOTES
Report to CANDELARIO HERNANDEZ, assumed patient care.      Mini Galindo, [place]  08/17/23 [address], RN  08/17/23 2637

## 2023-08-17 NOTE — ED NOTES
Pt up ambulatory to bathroom, tolerated well, feeling much better, denies pain or dizziness     Shaun Celaya RN  08/17/23 1953

## 2023-08-18 ENCOUNTER — TELEPHONE (OUTPATIENT)
Age: 58
End: 2023-08-18

## 2023-08-18 LAB
EKG ATRIAL RATE: 98 BPM
EKG DIAGNOSIS: NORMAL
EKG P AXIS: 76 DEGREES
EKG P-R INTERVAL: 154 MS
EKG Q-T INTERVAL: 350 MS
EKG QRS DURATION: 78 MS
EKG QTC CALCULATION (BAZETT): 446 MS
EKG R AXIS: 73 DEGREES
EKG T AXIS: 50 DEGREES
EKG VENTRICULAR RATE: 98 BPM

## 2023-08-18 PROCEDURE — 93010 ELECTROCARDIOGRAM REPORT: CPT | Performed by: SPECIALIST

## 2023-08-18 NOTE — ED NOTES
Pt discharged to home in [facility], states understanding of dc instructions and followup, work note given, family present to drive her home     Anastacia Aguirre  08/17/23 2022

## 2023-08-21 ENCOUNTER — TELEPHONE (OUTPATIENT)
Age: 58
End: 2023-08-21

## 2023-08-21 NOTE — TELEPHONE ENCOUNTER
Pt is calling because she is trying to schedule a new patient apt with the doctor. Pt is a hospital follow up from [facility].     [phone]

## 2023-08-24 LAB
EKG ATRIAL RATE: 147 BPM
EKG DIAGNOSIS: NORMAL
EKG P AXIS: -86 DEGREES
EKG P-R INTERVAL: 336 MS
EKG Q-T INTERVAL: 292 MS
EKG QRS DURATION: 78 MS
EKG QTC CALCULATION (BAZETT): 455 MS
EKG R AXIS: -86 DEGREES
EKG T AXIS: 108 DEGREES
EKG VENTRICULAR RATE: 146 BPM

## 2023-09-05 NOTE — TELEPHONE ENCOUNTER
Doesn't look like patient was ever called, so I called patient and left message with first available appointment.     Future Appointments   Date Time Provider [address]   10/25/2023  9:00 AM MD JOSE Garcia BS AMB

## 2023-11-13 ENCOUNTER — HOSPITAL ENCOUNTER (OUTPATIENT)
Age: 58
Discharge: HOME | End: 2023-11-16
Payer: COMMERCIAL

## 2023-11-13 DIAGNOSIS — Z98.1 ARTHRODESIS STATUS: ICD-10-CM

## 2023-11-13 PROCEDURE — 72080 X-RAY EXAM THORACOLMB 2/> VW: CPT

## 2024-04-02 ENCOUNTER — HOSPITAL ENCOUNTER (OUTPATIENT)
Facility: HOSPITAL | Age: 59
Discharge: HOME | End: 2024-04-05
Payer: COMMERCIAL

## 2024-04-02 DIAGNOSIS — M54.17 RADICULOPATHY, LUMBOSACRAL REGION: ICD-10-CM

## 2024-04-02 PROCEDURE — A9579 GAD-BASE MR CONTRAST NOS,1ML: HCPCS | Performed by: PHYSICIAN ASSISTANT

## 2024-04-02 PROCEDURE — 72158 MRI LUMBAR SPINE W/O & W/DYE: CPT

## 2024-04-02 PROCEDURE — 6360000004 HC RX CONTRAST MEDICATION: Performed by: PHYSICIAN ASSISTANT

## 2024-04-02 RX ADMIN — GADOTERIDOL 10 ML: 279.3 INJECTION, SOLUTION INTRAVENOUS at 08:37

## 2024-07-10 ENCOUNTER — TRANSCRIBE ORDERS (OUTPATIENT)
Facility: HOSPITAL | Age: 59
End: 2024-07-10

## 2024-07-10 DIAGNOSIS — Z12.31 SCREENING MAMMOGRAM FOR BREAST CANCER: Primary | ICD-10-CM

## 2024-07-25 ENCOUNTER — HOSPITAL ENCOUNTER (OUTPATIENT)
Facility: HOSPITAL | Age: 59
Discharge: HOME | End: 2024-07-25
Attending: SURGERY
Payer: COMMERCIAL

## 2024-07-25 ENCOUNTER — HOSPITAL ENCOUNTER (OUTPATIENT)
Facility: HOSPITAL | Age: 59
End: 2024-07-25
Attending: SURGERY
Payer: COMMERCIAL

## 2024-07-25 DIAGNOSIS — Z87.898 HISTORY OF LUMP OF LEFT BREAST: Primary | ICD-10-CM

## 2024-07-25 DIAGNOSIS — Z87.898 HISTORY OF LUMP OF LEFT BREAST: ICD-10-CM

## 2024-07-25 DIAGNOSIS — Z12.31 SCREENING MAMMOGRAM FOR BREAST CANCER: ICD-10-CM

## 2024-07-25 PROCEDURE — G0279 TOMOSYNTHESIS, MAMMO: HCPCS

## 2024-07-25 PROCEDURE — 76642 ULTRASOUND BREAST LIMITED: CPT

## (undated) DEVICE — TAPE,ELASTIC,FOAM,CURAD,3"X5.5YD,LF: Brand: CURAD

## (undated) DEVICE — CLIP LIG ADH PD HORZ MED BLU --

## (undated) DEVICE — SUTURE STRATAFIX SPRL MCRYL + SZ 3-0 L24IN ABSRB UD PS-2 SXMP1B108

## (undated) DEVICE — SURGICAL PROCEDURE PACK BASIN MAJ SET CUST NO CAUT

## (undated) DEVICE — ROCKER SWITCH PENCIL BLADE ELECTRODE, HOLSTER: Brand: EDGE

## (undated) DEVICE — DRAPE XR C ARM 41X74IN LF --

## (undated) DEVICE — DRAPE,REIN 53X77,STERILE: Brand: MEDLINE

## (undated) DEVICE — MAGNETIC INSTR DRAPE 20X16: Brand: MEDLINE INDUSTRIES, INC.

## (undated) DEVICE — STERILE POLYISOPRENE POWDER-FREE SURGICAL GLOVES: Brand: PROTEXIS

## (undated) DEVICE — TAPE,CLOTH/SILK,CURAD,3"X10YD,LF,40/CS: Brand: CURAD

## (undated) DEVICE — ELECTRODE BLDE L4IN NONINSULATED EDGE

## (undated) DEVICE — DILATOR KIT

## (undated) DEVICE — 3M™ TEGADERM™ TRANSPARENT FILM DRESSING FRAME STYLE, 1624W, 2-3/8 IN X 2-3/4 IN (6 CM X 7 CM), 100/CT 4CT/CASE: Brand: 3M™ TEGADERM™

## (undated) DEVICE — GOWN,SIRUS,NONRNF,SETINSLV,2XL,18/CS: Brand: MEDLINE

## (undated) DEVICE — SUTURE VCRL SZ 3-0 L27IN ABSRB UD L26MM SH 1/2 CIR J416H

## (undated) DEVICE — COVER,TABLE,60X90,STERILE: Brand: MEDLINE

## (undated) DEVICE — PACKING 8004000 NEURAY 200PK 13X13MM: Brand: NEURAY ®

## (undated) DEVICE — GOWN,AURORA,NON-REINFORCED,2XL: Brand: MEDLINE

## (undated) DEVICE — COVER LT HNDL PLAS RIG 1 PER PK

## (undated) DEVICE — SUTURE PDS II SZ 1 L36IN ABSRB VLT L48MM CTX 1/2 CIR Z371T

## (undated) DEVICE — SUTURE VCRL SZ 1 L36IN ABSRB UD L36MM CT-1 1/2 CIR J947H

## (undated) DEVICE — BLADE ELECTRODE: Brand: EDGE

## (undated) DEVICE — BONE MARROW KIT ASPIR 11 GA

## (undated) DEVICE — SUTURE PERMAHAND SZ 2-0 L30IN NONABSORBABLE BLK SILK W/O A305H

## (undated) DEVICE — Device

## (undated) DEVICE — MARKER,SKIN,WI/RULER AND LABELS: Brand: MEDLINE

## (undated) DEVICE — STRAP,POSITIONING,KNEE/BODY,FOAM,4X60": Brand: MEDLINE

## (undated) DEVICE — APPLICATOR BNDG 1MM ADH PREMIERPRO EXOFIN

## (undated) DEVICE — TRAP MUCUS SPECIMEN 40ML -- MEDICHOICE

## (undated) DEVICE — SUTURE VCRL SZ 2-0 L36IN ABSRB UD L36MM CT-1 1/2 CIR J945H

## (undated) DEVICE — STRIP,CLOSURE,WOUND,MEDI-STRIP,1/2X4: Brand: MEDLINE

## (undated) DEVICE — SUT SLK 2-0SH 30IN BLK --

## (undated) DEVICE — DRAPE C-ARMOUR C-ARM KIT --

## (undated) DEVICE — DRAPE,UTILITY,TAPE,15X26,STERILE: Brand: MEDLINE

## (undated) DEVICE — INFECTION CONTROL KIT SYS

## (undated) DEVICE — DRAIN KT WND 10FR RND 400ML --

## (undated) DEVICE — SYRINGE MED 20ML STD CLR PLAS LUERLOCK TIP N CTRL DISP

## (undated) DEVICE — FLOSEAL HEMOSTATIC MATRIX, 10 ML: Brand: FLOSEAL

## (undated) DEVICE — NDL SPNE QNCKE 18GX3.5IN LF --

## (undated) DEVICE — SPONGE GZ W4XL4IN COT 12 PLY TYP VII WVN C FLD DSGN

## (undated) DEVICE — 1010 S-DRAPE TOWEL DRAPE 10/BX: Brand: STERI-DRAPE™

## (undated) DEVICE — CONTAINER,SPECIMEN,3OZ,OR STRL: Brand: MEDLINE

## (undated) DEVICE — YANKAUER,OPEN TIP,W/O VENT,STERILE: Brand: MEDLINE INDUSTRIES, INC.

## (undated) DEVICE — SUTURE VCRL SZ 2-0 L36IN ABSRB UD L40MM CT 1/2 CIR J957H

## (undated) DEVICE — SYR 10ML LUER LOK 1/5ML GRAD --

## (undated) DEVICE — DRAPE,LAP,CHOLE,W/TROUGHS,STERILE: Brand: MEDLINE

## (undated) DEVICE — TOWEL SURG W17XL27IN STD BLU COT NONFENESTRATED PREWASHED

## (undated) DEVICE — SOLUTION IV 1000ML 0.9% SOD CHL

## (undated) DEVICE — SPHERE STEALTH 12PK/TY --

## (undated) DEVICE — SPONGE: SPECIALTY PEANUT XR 100/CS: Brand: MEDICAL ACTION INDUSTRIES

## (undated) DEVICE — NEEDLE HYPO 22GA L1.5IN BLK S STL HUB POLYPR SHLD REG BVL

## (undated) DEVICE — CODMAN® SURGICAL PATTIES 3/4" X 3/4" (1.91CM X 1.91CM): Brand: CODMAN®

## (undated) DEVICE — BLUNT DISSECTOR: Brand: ENDO PEANUT

## (undated) DEVICE — TIP CLEANER: Brand: VALLEYLAB

## (undated) DEVICE — Device: Brand: JELCO

## (undated) DEVICE — SUTURE ABSRB BRAID COAT UD CT NO 1 36IN VCRL J959H

## (undated) DEVICE — LAMINECTOMY RICHMOND-LF: Brand: MEDLINE INDUSTRIES, INC.

## (undated) DEVICE — GOWN,BREATHABLE,IMP SLV 3XL AURORA: Brand: MEDLINE

## (undated) DEVICE — AEGIS 1" DISK 4MM HOLE, PEEL OPEN: Brand: MEDLINE

## (undated) DEVICE — COVER,MAYO STAND,STERILE: Brand: MEDLINE

## (undated) DEVICE — (D)SOL MEDC ALC ISO 70% 16OZ -- CONVERT TO ITEM 364515

## (undated) DEVICE — REM POLYHESIVE ADULT PATIENT RETURN ELECTRODE: Brand: VALLEYLAB

## (undated) DEVICE — CANISTER, RIGID, 3000CC: Brand: MEDLINE INDUSTRIES, INC.

## (undated) DEVICE — TOOL 14MH30 LEGEND 14CM 3MM: Brand: MIDAS REX ™

## (undated) DEVICE — TRAY CATH OD16FR SIL URIN M STATLOK STBL DEV SURSTP

## (undated) DEVICE — STERILE-Z SURGICAL PATIENT COVERS CLEAR POLYETHYLENE STERILE UNIVERSAL FIT 10 PER CASE: Brand: STERILE-Z

## (undated) DEVICE — (D)PREP SKN CHLRAPRP APPL 26ML -- CONVERT TO ITEM 371833

## (undated) DEVICE — 3M™ TEGADERM™ TRANSPARENT FILM DRESSING FRAME STYLE, 1626, 4 IN X 4-3/4 IN (10 CM X 12 CM), 50/CT 4CT/CASE: Brand: 3M™ TEGADERM™

## (undated) DEVICE — STERILE POLYISOPRENE POWDER-FREE SURGICAL GLOVES WITH EMOLLIENT COATING: Brand: PROTEXIS

## (undated) DEVICE — ASTOUND STANDARD SURGICAL GOWN, XXL: Brand: CONVERTORS

## (undated) DEVICE — INTENDED FOR TISSUE SEPARATION, AND OTHER PROCEDURES THAT REQUIRE A SHARP SURGICAL BLADE TO PUNCTURE OR CUT.: Brand: BARD-PARKER ® CARBON RIB-BACK BLADES

## (undated) DEVICE — SYR LR LCK 1ML GRAD NSAF 30ML --

## (undated) DEVICE — TRAY PREP DRY W/ PREM GLV 2 APPL 6 SPNG 2 UNDPD 1 OVERWRAP

## (undated) DEVICE — SOLUTION IRRIG 1000ML H2O STRL BLT

## (undated) DEVICE — TRAP SUC MUCOUS 70ML -- MEDICHOICE MEDLINE

## (undated) DEVICE — CORD ELECSURG BPLR 12 FT DISP [810T818750] [ADLER INSTRUMENT CO]

## (undated) DEVICE — SUTURE VCRL SZ 2-0 L27IN ABSRB UD L26MM CT-2 1/2 CIR J269H

## (undated) DEVICE — JACKSON TABLE POSITIONER KIT: Brand: MEDLINE INDUSTRIES, INC.

## (undated) DEVICE — BONE WAX WHITE: Brand: BONE WAX WHITE

## (undated) DEVICE — SUTURE VCRL + SZ 1-0 L36IN ABSRB UD CTX 1/2 CIR TAPR PNT VCP977H

## (undated) DEVICE — SUTURE PERMAHAND SZ 3-0 L30IN NONABSORBABLE BLK SILK BRAID A304H